# Patient Record
Sex: MALE | Race: WHITE | NOT HISPANIC OR LATINO | Employment: FULL TIME | URBAN - METROPOLITAN AREA
[De-identification: names, ages, dates, MRNs, and addresses within clinical notes are randomized per-mention and may not be internally consistent; named-entity substitution may affect disease eponyms.]

---

## 2017-02-02 DIAGNOSIS — I47.10 SVT (SUPRAVENTRICULAR TACHYCARDIA): Primary | ICD-10-CM

## 2017-02-07 ENCOUNTER — HOSPITAL ENCOUNTER (OUTPATIENT)
Dept: CARDIOLOGY | Facility: CLINIC | Age: 47
Discharge: HOME | End: 2017-02-07
Payer: COMMERCIAL

## 2017-02-07 ENCOUNTER — OFFICE VISIT (OUTPATIENT)
Dept: ELECTROPHYSIOLOGY | Facility: CLINIC | Age: 47
End: 2017-02-07
Payer: COMMERCIAL

## 2017-02-07 VITALS
WEIGHT: 185 LBS | SYSTOLIC BLOOD PRESSURE: 146 MMHG | BODY MASS INDEX: 26.48 KG/M2 | HEART RATE: 68 BPM | HEIGHT: 70 IN | DIASTOLIC BLOOD PRESSURE: 91 MMHG

## 2017-02-07 DIAGNOSIS — I47.10 SVT (SUPRAVENTRICULAR TACHYCARDIA): ICD-10-CM

## 2017-02-07 PROCEDURE — 99245 OFF/OP CONSLTJ NEW/EST HI 55: CPT | Mod: S$GLB,,, | Performed by: INTERNAL MEDICINE

## 2017-02-07 PROCEDURE — 99999 PR PBB SHADOW E&M-EST. PATIENT-LVL III: CPT | Mod: PBBFAC,,, | Performed by: INTERNAL MEDICINE

## 2017-02-07 PROCEDURE — 93000 ELECTROCARDIOGRAM COMPLETE: CPT | Mod: S$GLB,,, | Performed by: INTERNAL MEDICINE

## 2017-02-07 RX ORDER — POTASSIUM CHLORIDE 1500 MG/1
20 TABLET, EXTENDED RELEASE ORAL NIGHTLY
COMMUNITY
Start: 2017-01-05 | End: 2019-08-14

## 2017-02-07 RX ORDER — DILTIAZEM HYDROCHLORIDE 30 MG/1
30 TABLET, FILM COATED ORAL 4 TIMES DAILY
COMMUNITY
End: 2017-04-11

## 2017-02-07 RX ORDER — LEVOTHYROXINE SODIUM 50 UG/1
50 TABLET ORAL NIGHTLY
COMMUNITY
Start: 2016-12-30 | End: 2019-08-14

## 2017-02-07 RX ORDER — DILTIAZEM HYDROCHLORIDE 120 MG/1
120 CAPSULE, EXTENDED RELEASE ORAL NIGHTLY
COMMUNITY
Start: 2017-01-25 | End: 2017-04-11

## 2017-02-07 NOTE — MR AVS SNAPSHOT
"    [facility]  [address]  [facility] [address]  Phone: [phone]  Fax: [phone]                  Manny Flores   2017 1:00 PM   Office Visit    Description:  Male : 1970   Provider:  Chris Fma MD   Department:  [facility]           Reason for Visit     Irregular Heart Beat           Diagnoses this Visit        Comments    SVT (supraventricular tachycardia)                To Do List           Goals (5 Years of Data)     None      [facility]     [facility] Nurse Care Line -  Assistance  Registered nurses in the [facility] On Call Center provide clinical advisement, health education, appointment booking, and other advisory services.  Call for this free service at [phone].             Medications           Message regarding Medications     Verify the changes and/or additions to your medication regime listed below are the same as discussed with your clinician today.  If any of these changes or additions are incorrect, please notify your healthcare provider.             Verify that the below list of medications is an accurate representation of the medications you are currently taking.  If none reported, the list may be blank. If incorrect, please contact your healthcare provider. Carry this list with you in case of emergency.           Current Medications     CARTIA  mg Cp24     diltiaZEM (CARDIZEM) 30 MG tablet Take 30 mg by mouth 4 (four) times daily.    KLOR-CON M20 20 mEq tablet     SYNTHROID 50 mcg tablet            Clinical Reference Information           Your Vitals Were     BP Pulse Height Weight BMI    146/91 68 5' 10" (1.778 m) 83.9 kg (185 lb) 26.54 kg/m2      Blood Pressure          Most Recent Value    BP  (!)  146/91      Allergies as of 2017     No Known Allergies      Immunizations Administered on Date of Encounter - 2017     None      [facility] Sign-Up     Activating your [facility] account is as " easy as 1-2-3!     1) Visit [email or web address], select Sign Up Now, enter this activation code and your date of birth, then select Next.  [email or web address]  Expires: 3/24/2017  1:58 PM      2) Create a username and password to use when you visit [facility] in the future and select a security question in case you lose your password and select Next.    3) Enter your e-mail address and click Sign Up!    Additional Information  If you have questions, please e-mail [email or web address] or call [phone] to talk to our [facility] staff. Remember, [facility] is NOT to be used for urgent needs. For medical emergencies, dial 911.         Language Assistance Services     ATTENTION: Language assistance services are available, free of charge. Please call [phone].      ATENCIÓN: Si habla español, tiene a tracy disposición servicios gratuitos de asistencia lingüística. Llame al [phone].     CHÚ Ý: N?u b?n nói Ti?ng Vi?t, có các d?ch v? h? tr? ngôn ng? mi?n phí dành cho b?n. G?i s? [phone].         [facility] complies with applicable Federal civil rights laws and does not discriminate on the basis of race, color, national origin, age, disability, or sex.

## 2017-02-07 NOTE — PROGRESS NOTES
Subjective:    Patient ID:  Manny Flores is a 47 y.o. male who presents for evaluation of Irregular Heart Beat      HPI Comments: 47 yoM AF, SVT here for arrhythmia evaluation. He had post operative AF after a hernia surgery. At the time there was documented pre-excited AF. He was placed on diltiazem prn for symptomatic events. He has had several symptomatic palpitations over the next year. He underwent a treadmill test where he went into a narrow complex, short RP tachycardia 230 bpm moments into the recovery phase of his test. He felt rapid palpitations during this episode. He had several more non-sustained events. This episode matched his clinical symptoms. He was placed on long acting diltiazem and underwent an echo that showed normal EF and mild MR. He has CHADS VASC of 1 (HTN) and is on aspirin. He is here to discuss EPS/RFA    Referring Physician: Kori Hawkins    Echo 2/1/17  LVEF 57%  Mild MR    Past Medical History:    Atrial fibrillation                                           Hypertension                                                  Supraventricular tachycardia                                  History reviewed. No pertinent surgical history.    Social History    Marital status:              Spouse name:                       Years of education:                 Number of children:               Occupational History    None on file    Social History Main Topics    Smoking status: Never Smoker                                                                   Smokeless status: Not on file                       Alcohol use: No                 Comment: 1-2  beer   a year    Drug use: No              Sexual activity: Yes                  Other Topics            Concern    None on file    Social History Narrative    None on file    History reviewed.  No pertinent family history.        Review of Systems   Constitution: Negative.   HENT: Negative.    Eyes: Negative.    Cardiovascular:  Positive for irregular heartbeat and palpitations. Negative for chest pain, dyspnea on exertion, leg swelling, near-syncope and syncope.   Respiratory: Negative.  Negative for shortness of breath.    Endocrine: Negative.    Hematologic/Lymphatic: Negative.    Skin: Negative.    Musculoskeletal: Negative.    Gastrointestinal: Negative.    Genitourinary: Negative.    Neurological: Negative.  Negative for dizziness and light-headedness.   Psychiatric/Behavioral: Negative.    Allergic/Immunologic: Negative.         Objective:    Physical Exam   Constitutional: He is oriented to person, place, and time. He appears well-developed and well-nourished. No distress.   HENT:   Head: Normocephalic and atraumatic.   Eyes: EOM are normal. Pupils are equal, round, and reactive to light.   Neck: Normal range of motion. No JVD present. No thyromegaly present.   Cardiovascular: Normal rate, regular rhythm, S1 normal, S2 normal and normal heart sounds.  PMI is not displaced.  Exam reveals no gallop and no friction rub.    No murmur heard.  Pulmonary/Chest: Effort normal and breath sounds normal. No respiratory distress. He has no wheezes. He has no rales.   Abdominal: Soft. Bowel sounds are normal. He exhibits no distension. There is no tenderness. There is no rebound and no guarding.   Musculoskeletal: Normal range of motion. He exhibits no edema or tenderness.   Neurological: He is alert and oriented to person, place, and time. No cranial nerve deficit.   Skin: Skin is warm and dry. No rash noted. No erythema.   Psychiatric: He has a normal mood and affect. His behavior is normal. Judgment and thought content normal.     ECG: NSR nl AL, QRS, QTc; no pre-excitation        Assessment:       1. SVT (supraventricular tachycardia)         Plan:       47 yoM symptomatic, documented SVT here for evaluation. I suspect he had ORT given documented pre-excited AF. I recommended EPS/RFA for definitive management. Extensive discussion regarding  risks, benefits, and alternative approaches to the procedure was had with the patient.  The patient voices understanding and all questions have been answered.  The patient would like to proceed as planned.    EPS/RFA for SVT  Anesthesia/MAC  Hold diltiazem 3 days prior  Consent obtained in clinic

## 2017-02-07 NOTE — LETTER
February 7, 2017      Yanet Hawkins MD  [address]  [facility] [address]           [facility]  [address]  [facility] [address]  Phone: [phone]  Fax: [phone]          Patient: Manny Flores   MR Number: [id number]   YOB: 1970   Date of Visit: 2/7/2017       Dear Dr. Yanet Hawkins:    Thank you for referring Manny Flores to me for evaluation. Attached you will find relevant portions of my assessment and plan of care.    If you have questions, please do not hesitate to call me. I look forward to following Manny Flores along with you.    Sincerely,    Chris Fam MD    Enclosure  CC:  No Recipients    If you would like to receive this communication electronically, please contact [email or web address] or [phone] to request more information on [employer] Link access.    For providers and/or their staff who would like to refer a patient to [facility], please contact us through our one-stop-shop provider referral line, [facility], at [phone].    If you feel you have received this communication in error or would no longer like to receive these types of communications, please e-mail [email or web address]

## 2017-02-09 ENCOUNTER — TELEPHONE (OUTPATIENT)
Dept: ELECTROPHYSIOLOGY | Facility: CLINIC | Age: 47
End: 2017-02-09

## 2017-02-09 DIAGNOSIS — I47.10 SVT (SUPRAVENTRICULAR TACHYCARDIA): Primary | ICD-10-CM

## 2017-02-09 NOTE — TELEPHONE ENCOUNTER
ABLATION EDUCATION CHECKLIST    2/24/17 - Lab Work   Pre-procedure labs have been ordered and sent to you.  Please fax results to [phone].   You do not have to fast for this lab work!    3/01/17 @ 10 AM - Ablation (arrival time)  Report to Cardiology Waiting Room on 3rd floor of the Hospital    (Do not report to clinic)  Directions for Reporting to Cardiology Waiting Area in the Hospital  If you park in the Parking Garage:  Take elevators to the 2nd floor  Walk up ramp and turn right by Gold Elevators  Take elevator to the 3rd floor  Upon exiting the elevator, turn away from the clinic areas  Walk long martinez around to front of hospital to area with windows overlooking [facility]  Check in at Reception Desk  OR  If family is dropping you off:  Have them drop you off at the front of the Hospital  (Near the ER, where all the flags are hung).  Take the E elevators to the 3rd floor.  Check in at the Reception Desk in the waiting room.    Do not eat or drink anything after: 12 mn on the night before your procedure    Medications:   HOLD diltiazem (Cardizem) three (3) days prior to your procedure. YOUR LAST DOSE: Saturday, February 25, 2017.   You may take your OTHER usual morning medications with a sip of water    You will be spending the night after your procedure  You will need someone to drive you home the day after your procedure.    Your pain during your procedure will be managed by the anesthesia team.     THE ABOVE INSTRUCTIONS WERE GIVEN TO THE PATIENT VERBALLY AND THEY VERBALIZED UNDERSTANDING.  THEY DO NOT REQUIRE ANY SPECIAL NEEDS AND DO NOT HAVE ANY LEARNING BARRIERS.    Any need to reschedule or cancel procedures, or any questions regarding your procedures should be addressed directly with the Arrhythmia Department Nurses at the following phone number: [phone]

## 2017-02-16 ENCOUNTER — TELEPHONE (OUTPATIENT)
Dept: ELECTROPHYSIOLOGY | Facility: CLINIC | Age: 47
End: 2017-02-16

## 2017-02-16 NOTE — TELEPHONE ENCOUNTER
Wife calling about nuclear stress test prior to ablation. Tried calling her back but signal poor. Wife to call back later.

## 2017-02-17 ENCOUNTER — TELEPHONE (OUTPATIENT)
Dept: ELECTROPHYSIOLOGY | Facility: CLINIC | Age: 47
End: 2017-02-17

## 2017-02-17 NOTE — TELEPHONE ENCOUNTER
Called wife and advised her that Dr. Fam was ok with pt having nuclear stress test prior to RFA, but doubts it is necessary. Pt's wife reports pt canceled test yesterday and told them he doesn't wish to proceed at this time.

## 2017-02-17 NOTE — TELEPHONE ENCOUNTER
----- Message from Lu Dougherty sent at 2/16/2017  2:17 PM CST -----  Contact: Wife of pt called  Wife of pt called, states she is returning your call and will like to be reached at Ph [phone]. Thank you

## 2017-02-21 ENCOUNTER — TELEPHONE (OUTPATIENT)
Dept: ELECTROPHYSIOLOGY | Facility: CLINIC | Age: 47
End: 2017-02-21

## 2017-02-21 NOTE — TELEPHONE ENCOUNTER
----- Message from Jami Arias MA sent at 2/21/2017 12:07 PM CST -----  Contact: Nury [phone] pt's wife       ----- Message -----     From: Keerthi Chacon     Sent: 2/21/2017  11:43 AM       To: [facility] Staff    Pt wife says her  is having a procedure on 3/1/17 and he has to have labs done before. She would like to know if a blood test can be added to the labs? Please call her at the number listed today.     Thanks

## 2017-02-21 NOTE — TELEPHONE ENCOUNTER
Pt sees nutritionist, and they want pt to have a homocysteine level drawn. Wife is asking if we can add it to pt's lab orders we are having him do on Friday. Explained we mailed lab orders to them already, so we cannot add orders as they already have them in their hands. Instructed her to contact nutritionist to write separate order and take it the same day they go in with our lab orders. Understanding verbalized.

## 2017-03-01 ENCOUNTER — SURGERY (OUTPATIENT)
Age: 47
End: 2017-03-01

## 2017-03-01 ENCOUNTER — ANESTHESIA EVENT (OUTPATIENT)
Dept: MEDSURG UNIT | Facility: HOSPITAL | Age: 47
End: 2017-03-01
Payer: COMMERCIAL

## 2017-03-01 ENCOUNTER — HOSPITAL ENCOUNTER (OUTPATIENT)
Facility: HOSPITAL | Age: 47
Discharge: HOME | End: 2017-03-01
Attending: INTERNAL MEDICINE | Admitting: INTERNAL MEDICINE
Payer: COMMERCIAL

## 2017-03-01 ENCOUNTER — ANESTHESIA (OUTPATIENT)
Dept: MEDSURG UNIT | Facility: HOSPITAL | Age: 47
End: 2017-03-01
Payer: COMMERCIAL

## 2017-03-01 VITALS
DIASTOLIC BLOOD PRESSURE: 81 MMHG | RESPIRATION RATE: 18 BRPM | SYSTOLIC BLOOD PRESSURE: 125 MMHG | WEIGHT: 185 LBS | OXYGEN SATURATION: 99 % | HEIGHT: 70 IN | HEART RATE: 68 BPM | TEMPERATURE: 98 F | BODY MASS INDEX: 26.48 KG/M2

## 2017-03-01 DIAGNOSIS — I47.10 SVT (SUPRAVENTRICULAR TACHYCARDIA): Primary | ICD-10-CM

## 2017-03-01 PROCEDURE — 93010 ELECTROCARDIOGRAM REPORT: CPT | Mod: ,,, | Performed by: INTERNAL MEDICINE

## 2017-03-01 PROCEDURE — D9220A PRA ANESTHESIA: Mod: ANES,,, | Performed by: ANESTHESIOLOGY

## 2017-03-01 PROCEDURE — 25000003 PHARM REV CODE 250: Performed by: INTERNAL MEDICINE

## 2017-03-01 PROCEDURE — 25000003 PHARM REV CODE 250: Performed by: NURSE ANESTHETIST, CERTIFIED REGISTERED

## 2017-03-01 PROCEDURE — 25000003 PHARM REV CODE 250

## 2017-03-01 PROCEDURE — 93621 COMP EP EVL L PAC&REC C SINS: CPT | Mod: 26,,, | Performed by: INTERNAL MEDICINE

## 2017-03-01 PROCEDURE — 25000003 PHARM REV CODE 250: Performed by: NURSE PRACTITIONER

## 2017-03-01 PROCEDURE — 37000009 HC ANESTHESIA EA ADD 15 MINS: Performed by: INTERNAL MEDICINE

## 2017-03-01 PROCEDURE — 93010 ELECTROCARDIOGRAM REPORT: CPT | Mod: 76,,, | Performed by: INTERNAL MEDICINE

## 2017-03-01 PROCEDURE — D9220A PRA ANESTHESIA: Mod: CRNA,,, | Performed by: NURSE ANESTHETIST, CERTIFIED REGISTERED

## 2017-03-01 PROCEDURE — 93623 PRGRMD STIMJ&PACG IV RX NFS: CPT | Mod: 26,,, | Performed by: INTERNAL MEDICINE

## 2017-03-01 PROCEDURE — 37000008 HC ANESTHESIA 1ST 15 MINUTES: Performed by: INTERNAL MEDICINE

## 2017-03-01 PROCEDURE — 63600175 PHARM REV CODE 636 W HCPCS: Performed by: NURSE ANESTHETIST, CERTIFIED REGISTERED

## 2017-03-01 PROCEDURE — 63600175 PHARM REV CODE 636 W HCPCS

## 2017-03-01 PROCEDURE — 25000003 PHARM REV CODE 250: Performed by: STUDENT IN AN ORGANIZED HEALTH CARE EDUCATION/TRAINING PROGRAM

## 2017-03-01 PROCEDURE — C1730 CATH, EP, 19 OR FEW ELECT: HCPCS

## 2017-03-01 PROCEDURE — 93613 INTRACARDIAC EPHYS 3D MAPG: CPT | Mod: ,,, | Performed by: INTERNAL MEDICINE

## 2017-03-01 PROCEDURE — 93005 ELECTROCARDIOGRAM TRACING: CPT

## 2017-03-01 PROCEDURE — 93620 COMP EP EVL R AT VEN PAC&REC: CPT | Mod: 26,,, | Performed by: INTERNAL MEDICINE

## 2017-03-01 RX ORDER — FLECAINIDE ACETATE 100 MG/1
100 TABLET ORAL EVERY 12 HOURS
Qty: 60 TABLET | Refills: 3 | Status: SHIPPED | OUTPATIENT
Start: 2017-03-01 | End: 2017-04-06

## 2017-03-01 RX ORDER — ACETAMINOPHEN 325 MG/1
650 TABLET ORAL ONCE
Status: DISCONTINUED | OUTPATIENT
Start: 2017-03-01 | End: 2017-03-01

## 2017-03-01 RX ORDER — DILTIAZEM HYDROCHLORIDE 120 MG/1
120 CAPSULE, COATED, EXTENDED RELEASE ORAL NIGHTLY
Status: DISCONTINUED | OUTPATIENT
Start: 2017-03-01 | End: 2017-03-02 | Stop reason: HOSPADM

## 2017-03-01 RX ORDER — LEVOTHYROXINE SODIUM 50 UG/1
50 TABLET ORAL NIGHTLY
Status: DISCONTINUED | OUTPATIENT
Start: 2017-03-01 | End: 2017-03-02 | Stop reason: HOSPADM

## 2017-03-01 RX ORDER — PROPOFOL 10 MG/ML
VIAL (ML) INTRAVENOUS CONTINUOUS PRN
Status: DISCONTINUED | OUTPATIENT
Start: 2017-03-01 | End: 2017-03-01

## 2017-03-01 RX ORDER — SODIUM CHLORIDE 9 MG/ML
INJECTION, SOLUTION INTRAVENOUS CONTINUOUS
Status: DISCONTINUED | OUTPATIENT
Start: 2017-03-01 | End: 2017-03-01

## 2017-03-01 RX ORDER — LIDOCAINE HCL/PF 100 MG/5ML
SYRINGE (ML) INTRAVENOUS
Status: DISCONTINUED | OUTPATIENT
Start: 2017-03-01 | End: 2017-03-01

## 2017-03-01 RX ORDER — FLECAINIDE ACETATE 50 MG/1
100 TABLET ORAL EVERY 12 HOURS
Status: DISCONTINUED | OUTPATIENT
Start: 2017-03-01 | End: 2017-03-02 | Stop reason: HOSPADM

## 2017-03-01 RX ORDER — ACETAMINOPHEN 325 MG/1
650 TABLET ORAL ONCE
Status: COMPLETED | OUTPATIENT
Start: 2017-03-01 | End: 2017-03-01

## 2017-03-01 RX ADMIN — FLECAINIDE ACETATE 100 MG: 50 TABLET ORAL at 07:03

## 2017-03-01 RX ADMIN — ISOPROTERENOL HYDROCHLORIDE 2 MCG/MIN: 0.2 INJECTION, SOLUTION INTRACARDIAC; INTRAMUSCULAR; INTRAVENOUS; SUBCUTANEOUS at 02:03

## 2017-03-01 RX ADMIN — SODIUM CHLORIDE: 0.9 INJECTION, SOLUTION INTRAVENOUS at 01:03

## 2017-03-01 RX ADMIN — PROPOFOL 150 MCG/KG/MIN: 10 INJECTION, EMULSION INTRAVENOUS at 01:03

## 2017-03-01 RX ADMIN — LIDOCAINE HYDROCHLORIDE 50 MG: 20 INJECTION, SOLUTION INTRAVENOUS at 01:03

## 2017-03-01 RX ADMIN — SODIUM CHLORIDE, SODIUM GLUCONATE, SODIUM ACETATE, POTASSIUM CHLORIDE, MAGNESIUM CHLORIDE, SODIUM PHOSPHATE, DIBASIC, AND POTASSIUM PHOSPHATE: .53; .5; .37; .037; .03; .012; .00082 INJECTION, SOLUTION INTRAVENOUS at 02:03

## 2017-03-01 RX ADMIN — ACETAMINOPHEN 650 MG: 325 TABLET ORAL at 05:03

## 2017-03-01 NOTE — NURSING TRANSFER
Nursing Transfer Note      3/1/2017     Transfer To: [id number]    Transfer via stretcher    Transfer with cardiac monitoring    Transported by rn    Medicines sent: none    Chart send with patient: Yes    Notified: nurse    Patient reassessed at: see epic (date, time)

## 2017-03-01 NOTE — INTERVAL H&P NOTE
The patient has been examined and the H&P has been reviewed:    No acute changes since previous visit.    Risks and benefits discussed in clinic, consent obtained previously.  Procedure in detail discussed with patient and wife, questions answered.    Sedation per anesthesia.    Abhilash Lerma MD

## 2017-03-01 NOTE — ANESTHESIA PREPROCEDURE EVALUATION
03/01/2017  Manny Flores is a 47 y.o., male.    [facility] Anesthesia Evaluation    I have reviewed the Patient Summary Reports.    I have reviewed the Nursing Notes.   I have reviewed the Medications.     Review of Systems  Anesthesia Hx:  No problems with previous Anesthesia    Cardiovascular:   Hypertension Dysrhythmias (SVT)    Pulmonary:  Pulmonary Normal    Renal/:  Renal/ Normal     Hepatic/GI:  Hepatic/GI Normal    Neurological:  Neurology Normal    Endocrine:  Endocrine Normal        Physical Exam  General:  Well nourished    Airway/Jaw/Neck:  Airway Findings: Mouth Opening: Normal Tongue: Normal  General Airway Assessment: Adult  Mallampati: II  TM Distance: Normal, at least 6 cm       Chest/Lungs:  Chest/Lungs Findings: Clear to auscultation, Normal Respiratory Rate     Heart/Vascular:  Heart Findings: Rate: Normal  Rhythm: Regular Rhythm             Anesthesia Plan  Type of Anesthesia, risks & benefits discussed:  Anesthesia Type:  general, MAC  Patient's Preference:   Intra-op Monitoring Plan:   Intra-op Monitoring Plan Comments:   Post Op Pain Control Plan:   Post Op Pain Control Plan Comments:   Induction:   IV  Beta Blocker:  Patient is not currently on a Beta-Blocker (No further documentation required).       Informed Consent: Patient understands risks and agrees with Anesthesia plan.  Questions answered. Anesthesia consent signed with patient.  ASA Score: 2     Day of Surgery Review of History & Physical:            Ready For Surgery From Anesthesia Perspective.     Patient Active Problem List   Diagnosis    SVT (supraventricular tachycardia)

## 2017-03-01 NOTE — PROGRESS NOTES
Pt returned to room AAOx4 and denies pain.  VSS.  Bilateral groin sites c/d/i and soft.  Pedal pulses palpable bilaterally.  Family called to bedside and post procedure protocol reviewed.  Will continue to monitor.

## 2017-03-01 NOTE — H&P (VIEW-ONLY)
Subjective:    Patient ID:  Manny Flores is a 47 y.o. male who presents for evaluation of Irregular Heart Beat      HPI Comments: 47 yoM AF, SVT here for arrhythmia evaluation. He had post operative AF after a hernia surgery. At the time there was documented pre-excited AF. He was placed on diltiazem prn for symptomatic events. He has had several symptomatic palpitations over the next year. He underwent a treadmill test where he went into a narrow complex, short RP tachycardia 230 bpm moments into the recovery phase of his test. He felt rapid palpitations during this episode. He had several more non-sustained events. This episode matched his clinical symptoms. He was placed on long acting diltiazem and underwent an echo that showed normal EF and mild MR. He has CHADS VASC of 1 (HTN) and is on aspirin. He is here to discuss EPS/RFA    Referring Physician: Kori Hawkins    Echo 2/1/17  LVEF 57%  Mild MR    Past Medical History:    Atrial fibrillation                                           Hypertension                                                  Supraventricular tachycardia                                  History reviewed. No pertinent surgical history.    Social History    Marital status:              Spouse name:                       Years of education:                 Number of children:               Occupational History    None on file    Social History Main Topics    Smoking status: Never Smoker                                                                   Smokeless status: Not on file                       Alcohol use: No                 Comment: 1-2  beer   a year    Drug use: No              Sexual activity: Yes                  Other Topics            Concern    None on file    Social History Narrative    None on file    History reviewed.  No pertinent family history.        Review of Systems   Constitution: Negative.   HENT: Negative.    Eyes: Negative.    Cardiovascular:  Positive for irregular heartbeat and palpitations. Negative for chest pain, dyspnea on exertion, leg swelling, near-syncope and syncope.   Respiratory: Negative.  Negative for shortness of breath.    Endocrine: Negative.    Hematologic/Lymphatic: Negative.    Skin: Negative.    Musculoskeletal: Negative.    Gastrointestinal: Negative.    Genitourinary: Negative.    Neurological: Negative.  Negative for dizziness and light-headedness.   Psychiatric/Behavioral: Negative.    Allergic/Immunologic: Negative.         Objective:    Physical Exam   Constitutional: He is oriented to person, place, and time. He appears well-developed and well-nourished. No distress.   HENT:   Head: Normocephalic and atraumatic.   Eyes: EOM are normal. Pupils are equal, round, and reactive to light.   Neck: Normal range of motion. No JVD present. No thyromegaly present.   Cardiovascular: Normal rate, regular rhythm, S1 normal, S2 normal and normal heart sounds.  PMI is not displaced.  Exam reveals no gallop and no friction rub.    No murmur heard.  Pulmonary/Chest: Effort normal and breath sounds normal. No respiratory distress. He has no wheezes. He has no rales.   Abdominal: Soft. Bowel sounds are normal. He exhibits no distension. There is no tenderness. There is no rebound and no guarding.   Musculoskeletal: Normal range of motion. He exhibits no edema or tenderness.   Neurological: He is alert and oriented to person, place, and time. No cranial nerve deficit.   Skin: Skin is warm and dry. No rash noted. No erythema.   Psychiatric: He has a normal mood and affect. His behavior is normal. Judgment and thought content normal.     ECG: NSR nl CT, QRS, QTc; no pre-excitation        Assessment:       1. SVT (supraventricular tachycardia)         Plan:       47 yoM symptomatic, documented SVT here for evaluation. I suspect he had ORT given documented pre-excited AF. I recommended EPS/RFA for definitive management. Extensive discussion regarding  risks, benefits, and alternative approaches to the procedure was had with the patient.  The patient voices understanding and all questions have been answered.  The patient would like to proceed as planned.    EPS/RFA for SVT  Anesthesia/MAC  Hold diltiazem 3 days prior  Consent obtained in clinic

## 2017-03-01 NOTE — PROGRESS NOTES
Pt is AAOx4 and denies pain.  VSS.  Admit questions completed.  Wife at bedside.  See full assessment for details.  Will continue to monitor.

## 2017-03-01 NOTE — PLAN OF CARE
Problem: Patient Care Overview  Goal: Plan of Care Review  Outcome: Ongoing (interventions implemented as appropriate)  See epic    Comments:   See epic

## 2017-03-01 NOTE — TRANSFER OF CARE
"Anesthesia Transfer of Care Note    Patient: Manny Flores    Procedure(s) Performed: Procedure(s) (LRB):  ABLATION (N/A)    Patient location: PACU    Anesthesia Type: general    Transport from OR: Transported from OR on 6-10 L/min O2 by face mask with adequate spontaneous ventilation    Post pain: adequate analgesia    Post assessment: no apparent anesthetic complications and tolerated procedure well    Post vital signs: stable    Level of consciousness: sedated    Nausea/Vomiting: no nausea/vomiting    Complications: none          Last vitals:   Visit Vitals    BP (!) 141/77 (BP Location: Left arm, Patient Position: Lying, BP Method: Automatic)    Pulse 81    Temp 36.7 °C (98.1 °F) (Oral)    Resp 16    Ht 5' 10" (1.778 m)    Wt 83.9 kg (185 lb)    SpO2 98%    BMI 26.54 kg/m2     "

## 2017-03-01 NOTE — PROGRESS NOTES
Patient admitted to recovery see [facility] for complete assessment pacu bcg's maintained safety measures verified patient instructed on pain scale and patient verbalized understanding. Called patient's wife and updated on patient location with the permission of patient.

## 2017-03-01 NOTE — IP AVS SNAPSHOT
[facility]  [address]  [facility] [address]  Phone: [phone]           Patient Discharge Instructions     Our goal is to set you up for success. This packet includes information on your condition, medications, and your home care. It will help you to care for yourself so you don't get sicker and need to go back to the hospital.     Please ask your nurse if you have any questions.        There are many details to remember when preparing to leave the hospital. Here is what you will need to do:    1. Take your medicine. If you are prescribed medications, review your Medication List in the following pages. You may have new medications to  at the pharmacy and others that you'll need to stop taking. Review the instructions for how and when to take your medications. Talk with your doctor or nurses if you are unsure of what to do.     2. Go to your follow-up appointments. Specific follow-up information is listed in the following pages. Your may be contacted by a transition nurse or clinical provider about future appointments. Be sure we have all of the phone numbers to reach you, if needed. Please contact your provider's office if you are unable to make an appointment.     3. Watch for warning signs. Your doctor or nurse will give you detailed warning signs to watch for and when to call for assistance. These instructions may also include educational information about your condition. If you experience any of warning signs to your health, call your doctor.               [facility]  Unless otherwise directed by your provider, please contact [facility], our nurse care line that is available for 24/7 assistance.     [phone] (toll-free)    Registered nurses in the [facility] provide clinical advisement, health education, appointment booking, and other advisory services.                    ** Verify the list of medication(s) below is accurate and up  to date. Carry this with you in case of emergency. If your medications have changed, please notify your healthcare provider.             Medication List      START taking these medications        Additional Info                      flecainide 100 MG Tab   Commonly known as:  TAMBOCOR   Quantity:  60 tablet   Refills:  3   Dose:  100 mg    Last time this was given:  100 mg on 3/1/2017  7:59 PM   Instructions:  Take 1 tablet (100 mg total) by mouth every 12 (twelve) hours.     Begin Date    AM    Noon    PM    Bedtime         CONTINUE taking these medications        Additional Info                      * diltiaZEM 30 MG tablet   Commonly known as:  CARDIZEM   Refills:  0   Dose:  30 mg    Instructions:  Take 30 mg by mouth 4 (four) times daily.     Begin Date    AM    Noon    PM    Bedtime       * CARTIA  MG Cp24   Refills:  0   Generic drug:  diltiaZEM    Instructions:  every evening.     Begin Date    AM    Noon    PM    Bedtime       KLOR-CON M20 20 MEQ tablet   Refills:  0   Generic drug:  potassium chloride SA    Instructions:  every evening.     Begin Date    AM    Noon    PM    Bedtime       SYNTHROID 50 MCG tablet   Refills:  0   Generic drug:  levothyroxine    Instructions:  every evening.     Begin Date    AM    Noon    PM    Bedtime       * Notice:  This list has 2 medication(s) that are the same as other medications prescribed for you. Read the directions carefully, and ask your doctor or other care provider to review them with you.         Where to Get Your Medications      These medications were sent to [facility] [address] [facility], [place] - [address]  [address] [place] [address]     Phone:  [phone]     flecainide 100 MG Tab                  Please bring to all follow up appointments:    1. A copy of your discharge instructions.  2. All medicines you are currently taking in their original bottles.  3. Identification and insurance card.    Please arrive 15  minutes ahead of scheduled appointment time.    Please call 24 hours in advance if you must reschedule your appointment and/or time.        Follow-up Information     Follow up with Chris Fam MD In 6 weeks.    Specialties:  Electrophysiology, Cardiovascular Disease    Contact information:    [address]  [place] [address]  [phone]          Discharge Instructions     Future Orders    Holter Monitor - 3-14 Day Adult (zio patch)       Discharge References/Attachments     CATHETER ABLATION, AFTER (ENGLISH)    FLECAINIDE TABLETS (ENGLISH)        Primary Diagnosis     Your primary diagnosis was:  Irregular Heartbeat      Admission Information     Date & Time Provider Department CSN    3/1/2017  8:33 AM Chris Fam MD [facility] [id number]      Care Providers     Provider Role Specialty Primary office phone    Chris Fam MD Attending Provider Electrophysiology [phone]    Chris Fam MD Surgeon  Electrophysiology [phone]      Your Vitals Were     BP                   125/81           Recent Lab Values     No lab values to display.      Allergies as of 3/1/2017     No Known Allergies      Advance Directives     An advance directive is a document which, in the event you are no longer able to make decisions for yourself, tells your healthcare team what kind of treatment you do or do not want to receive, or who you would like to make those decisions for you.  If you do not currently have an advance directive, [facility] encourages you to create one.  For more information call:  [phone]), [phone] ([phone]),  or log on to [email or web address].        Language Assistance Services     ATTENTION: Language assistance services are available, free of charge. Please call [phone].      ATENCIÓN: Si habla español, tiene a tracy disposición servicios gratuitos de asistencia lingüística. Llame al [phone].     ALBERT Ý: N?u b?n  nói Ti?ng Vi?t, có các d?ch v? h? tr? ngôn ng? mi?n phí dành cho b?n. G?i s? [phone].        [facility] Sign-Up     Activating your [facility] account is as easy as 1-2-3!     1) Visit [email or web address], select Sign Up Now, enter this activation code and your date of birth, then select Next.  [email or web address]  Expires: 3/24/2017  1:58 PM      2) Create a username and password to use when you visit [facility] in the future and select a security question in case you lose your password and select Next.    3) Enter your e-mail address and click Sign Up!    Additional Information  If you have questions, please e-mail [email or web address] or call [phone] to talk to our [facility] staff. Remember, [facility] is NOT to be used for urgent needs. For medical emergencies, dial 911.          [facility] complies with applicable Federal civil rights laws and does not discriminate on the basis of race, color, national origin, age, disability, or sex.

## 2017-03-01 NOTE — ANESTHESIA RELEASE NOTE
Anesthesia Release from PACU Note    Patient: Manny Flores  Anesthesia Release from PACU Note    Patient: Manny Flores    Procedure(s) Performed: Procedure(s) (LRB):  ABLATION (N/A)    Anesthesia type: general    Post pain: Adequate analgesia    Post assessment: no apparent anesthetic complications, tolerated procedure well and no evidence of recall    Post vital signs:   Vitals:    03/01/17 1545   BP: 138/78   Pulse: 74   Resp: 18   Temp:         Level of consciousness: awake, alert  and oriented    Nausea/Vomiting: no nausea/no vomiting    Complications: none    Airway Patency: patent    Respiratory: unassisted, spontaneous ventilation    Cardiovascular: stable and blood pressure at baseline    Hydration: euvolemic

## 2017-03-01 NOTE — ANESTHESIA POSTPROCEDURE EVALUATION
"Anesthesia Post Evaluation    Patient: Manny Flores    Procedure(s) Performed: Procedure(s) (LRB):  ABLATION (N/A)    Final Anesthesia Type: general  Patient location during evaluation: PACU  Patient participation: Yes- Able to Participate  Level of consciousness: awake and alert  Post-procedure vital signs: reviewed and stable  Pain management: adequate  Airway patency: patent  PONV status at discharge: No PONV  Anesthetic complications: no      Cardiovascular status: blood pressure returned to baseline  Respiratory status: unassisted  Hydration status: euvolemic  Follow-up not needed.        Visit Vitals    /78 (BP Location: Left arm, Patient Position: Lying, BP Method: Automatic)    Pulse 74    Temp 36.8 °C (98.2 °F) (Axillary)    Resp 18    Ht 5' 10" (1.778 m)    Wt 83.9 kg (185 lb)    SpO2 98%    BMI 26.54 kg/m2       Pain/Silas Score: Pain Assessment Performed: Yes (3/1/2017  3:30 PM)  Presence of Pain: denies (3/1/2017  3:30 PM)  Silas Score: 10 (3/1/2017  3:00 PM)      "

## 2017-03-02 ENCOUNTER — LAB VISIT (OUTPATIENT)
Dept: LAB | Facility: HOSPITAL | Age: 47
End: 2017-03-02
Attending: INTERNAL MEDICINE
Payer: COMMERCIAL

## 2017-03-02 ENCOUNTER — OFFICE VISIT (OUTPATIENT)
Dept: CARDIOLOGY | Facility: CLINIC | Age: 47
End: 2017-03-02
Payer: COMMERCIAL

## 2017-03-02 ENCOUNTER — CLINICAL SUPPORT (OUTPATIENT)
Dept: ELECTROPHYSIOLOGY | Facility: CLINIC | Age: 47
End: 2017-03-02
Payer: COMMERCIAL

## 2017-03-02 VITALS
SYSTOLIC BLOOD PRESSURE: 124 MMHG | BODY MASS INDEX: 27.11 KG/M2 | DIASTOLIC BLOOD PRESSURE: 72 MMHG | WEIGHT: 189.38 LBS | HEART RATE: 68 BPM | HEIGHT: 70 IN

## 2017-03-02 DIAGNOSIS — I47.10 SVT (SUPRAVENTRICULAR TACHYCARDIA): ICD-10-CM

## 2017-03-02 DIAGNOSIS — I48.0 PAROXYSMAL ATRIAL FIBRILLATION: ICD-10-CM

## 2017-03-02 DIAGNOSIS — I48.0 PAROXYSMAL A-FIB: ICD-10-CM

## 2017-03-02 DIAGNOSIS — I48.0 PAROXYSMAL A-FIB: Primary | ICD-10-CM

## 2017-03-02 LAB
ANION GAP SERPL CALC-SCNC: 8 MMOL/L
APTT BLDCRRT: 28.2 SEC
BASOPHILS # BLD AUTO: 0.02 K/UL
BASOPHILS NFR BLD: 0.3 %
BUN SERPL-MCNC: 19 MG/DL
CALCIUM SERPL-MCNC: 9.4 MG/DL
CHLORIDE SERPL-SCNC: 104 MMOL/L
CO2 SERPL-SCNC: 27 MMOL/L
CREAT SERPL-MCNC: 1.1 MG/DL
DIFFERENTIAL METHOD: ABNORMAL
EOSINOPHIL # BLD AUTO: 0.2 K/UL
EOSINOPHIL NFR BLD: 2.7 %
ERYTHROCYTE [DISTWIDTH] IN BLOOD BY AUTOMATED COUNT: 13.7 %
EST. GFR  (AFRICAN AMERICAN): >60 ML/MIN/1.73 M^2
EST. GFR  (NON AFRICAN AMERICAN): >60 ML/MIN/1.73 M^2
GLUCOSE SERPL-MCNC: 92 MG/DL
HCT VFR BLD AUTO: 43.7 %
HGB BLD-MCNC: 15.3 G/DL
INR PPP: 1
LYMPHOCYTES # BLD AUTO: 2.1 K/UL
LYMPHOCYTES NFR BLD: 35.1 %
MCH RBC QN AUTO: 31.9 PG
MCHC RBC AUTO-ENTMCNC: 35 %
MCV RBC AUTO: 91 FL
MONOCYTES # BLD AUTO: 0.4 K/UL
MONOCYTES NFR BLD: 7.2 %
NEUTROPHILS # BLD AUTO: 3.3 K/UL
NEUTROPHILS NFR BLD: 54.7 %
PLATELET # BLD AUTO: 229 K/UL
PMV BLD AUTO: 8.6 FL
POTASSIUM SERPL-SCNC: 4.3 MMOL/L
PROTHROMBIN TIME: 10.4 SEC
RBC # BLD AUTO: 4.79 M/UL
SODIUM SERPL-SCNC: 139 MMOL/L
WBC # BLD AUTO: 5.96 K/UL

## 2017-03-02 PROCEDURE — 1160F RVW MEDS BY RX/DR IN RCRD: CPT | Mod: S$GLB,,, | Performed by: INTERNAL MEDICINE

## 2017-03-02 PROCEDURE — 0296T HOLTER MONITOR - 3-14 DAY ADULT: CPT | Mod: 51,,, | Performed by: INTERNAL MEDICINE

## 2017-03-02 PROCEDURE — 99999 PR PBB SHADOW E&M-EST. PATIENT-LVL III: CPT | Mod: PBBFAC,,, | Performed by: INTERNAL MEDICINE

## 2017-03-02 PROCEDURE — 85025 COMPLETE CBC W/AUTO DIFF WBC: CPT

## 2017-03-02 PROCEDURE — 80048 BASIC METABOLIC PNL TOTAL CA: CPT

## 2017-03-02 PROCEDURE — 85610 PROTHROMBIN TIME: CPT

## 2017-03-02 PROCEDURE — 0298T HOLTER MONITOR - 3-14 DAY ADULT: CPT | Mod: ,,, | Performed by: INTERNAL MEDICINE

## 2017-03-02 PROCEDURE — 99212 OFFICE O/P EST SF 10 MIN: CPT | Mod: S$GLB,,, | Performed by: INTERNAL MEDICINE

## 2017-03-02 PROCEDURE — 36415 COLL VENOUS BLD VENIPUNCTURE: CPT

## 2017-03-02 PROCEDURE — 85730 THROMBOPLASTIN TIME PARTIAL: CPT

## 2017-03-02 NOTE — NURSING
Dr Lerma notified of patient's protocol complete with no signs or symptoms of complications from procedure.  Patient and wife are questioning if he is being discharged.

## 2017-03-02 NOTE — DISCHARGE SUMMARY
[facility]  Discharge Note  Short Stay    Admit Date: 3/1/2017    Discharge Date and Time: 03/01/2017     Attending Physician: Chris Fam MD     Discharge Provider: Abhilash Lerma    Diagnoses:  Active Hospital Problems    Diagnosis  POA    *SVT (supraventricular tachycardia) [I47.1]  Yes      Resolved Hospital Problems    Diagnosis Date Resolved POA   No resolved problems to display.       Discharged Condition: good    Hospital Course: Patient was admitted for an outpatient procedure and tolerated the procedure well with no complications.    Final Diagnoses: AF    Disposition: Home or Self Care    Follow up/Patient Instructions:    Medications:  Reconciled Home Medications:   Current Discharge Medication List      START taking these medications    Details   flecainide (TAMBOCOR) 100 MG Tab Take 1 tablet (100 mg total) by mouth every 12 (twelve) hours.  Qty: 60 tablet, Refills: 3         CONTINUE these medications which have NOT CHANGED    Details   KLOR-CON M20 20 mEq tablet every evening.       SYNTHROID 50 mcg tablet every evening.       CARTIA  mg Cp24 every evening.       diltiaZEM (CARDIZEM) 30 MG tablet Take 30 mg by mouth 4 (four) times daily.             Discharge Procedure Orders  Holter Monitor - 3-14 Day Adult (zio patch)   Standing Status: Future  Standing Exp. Date: 03/01/18       Follow-up Information     Follow up with Chris Fam MD In 6 weeks.    Specialties:  Electrophysiology, Cardiovascular Disease    Contact information:    [address] [place] [address]  [phone]            Discharge Procedure Orders (must include Diet, Follow-up, Activity):    Discharge Procedure Orders (must include Diet, Follow-up, Activity)  Holter Monitor - 3-14 Day Adult (zio patch)   Standing Status: Future  Standing Exp. Date: 03/01/18

## 2017-03-02 NOTE — NURSING
Discharge instructions and medlist given.  Medication education given.  Instructed to go to clinic in am after 8am for monitor.  Patient and wife verbalized understanding.  Denies need for wheelchair and escort for discharge.  Off unit [facility] with [facility].

## 2017-03-02 NOTE — MR AVS SNAPSHOT
[facility]  [address]  [address]  [place] [address]  Phone: [phone]  Fax: [phone]                  Manny Flores   3/2/2017 1:00 PM   Office Visit    Description:  Male : 1970   Provider:  Chris Fam MD   Department:  [facility]           Diagnoses this Visit        Comments    Paroxysmal atrial fibrillation                To Do List           Goals (5 Years of Data)     None      Follow-Up and Disposition     Return in about 4 weeks (around 3/30/2017).      [facility]     [facility] On Call Nurse Care Line -  Assistance  Registered nurses in the [facility] Call Center provide clinical advisement, health education, appointment booking, and other advisory services.  Call for this free service at [phone].             Medications           Message regarding Medications     Verify the changes and/or additions to your medication regime listed below are the same as discussed with your clinician today.  If any of these changes or additions are incorrect, please notify your healthcare provider.             Verify that the below list of medications is an accurate representation of the medications you are currently taking.  If none reported, the list may be blank. If incorrect, please contact your healthcare provider. Carry this list with you in case of emergency.           Current Medications     CARTIA  mg Cp24 every evening.     diltiaZEM (CARDIZEM) 30 MG tablet Take 30 mg by mouth 4 (four) times daily.    flecainide (TAMBOCOR) 100 MG Tab Take 1 tablet (100 mg total) by mouth every 12 (twelve) hours.    KLOR-CON M20 20 mEq tablet every evening.     SYNTHROID 50 mcg tablet every evening.            Clinical Reference Information           Your Vitals Were     BP                   124/72 (BP Location: Left arm, Patient Position: Sitting, BP Method: Manual)           Blood Pressure          Most Recent Value    BP  124/72       Allergies as of 3/2/2017     No Known Allergies      Immunizations Administered on Date of Encounter - 3/2/2017     None      [facility] Sign-Up     Activating your [facility] account is as easy as 1-2-3!     1) Visit [email or web address], select Sign Up Now, enter this activation code and your date of birth, then select Next.  [email or web address]  Expires: 3/24/2017  1:58 PM      2) Create a username and password to use when you visit [facility] in the future and select a security question in case you lose your password and select Next.    3) Enter your e-mail address and click Sign Up!    Additional Information  If you have questions, please e-mail [email or web address] or call [phone] to talk to our [facility] staff. Remember, [facility] is NOT to be used for urgent needs. For medical emergencies, dial 911.         Language Assistance Services     ATTENTION: Language assistance services are available, free of charge. Please call [phone].      ATENCIÓN: Si habla bhavesh, tiene a tracy disposición servicios gratuitos de asistencia lingüística. Llame al [phone].     CHÚ Ý: N?u b?n nói Ti?ng Vi?t, có các d?ch v? h? tr? ngôn ng? mi?n phí dành cho b?n. G?i s? [phone].         [facility] complies with applicable Federal civil rights laws and does not discriminate on the basis of race, color, national origin, age, disability, or sex.

## 2017-03-02 NOTE — PROGRESS NOTES
Subjective:    Patient ID:  Manny Flores is a 47 y.o. male who presents for follow-up of No chief complaint on file.      HPI Comments: 47 yoM AF, SVT here for arrhythmia evaluation. He had post operative AF after a hernia surgery. At the time there was documented pre-excited AF. He was placed on diltiazem prn for symptomatic events. He has had several symptomatic palpitations over the next year. He underwent a treadmill test where he went into a narrow complex, short RP tachycardia 230 bpm moments into the recovery phase of his test. He felt rapid palpitations during this episode. He had several more non-sustained events. This episode matched his clinical symptoms. He was placed on long acting diltiazem and underwent an echo that showed normal EF and mild MR. He has CHADS VASC of 1 (HTN) and is on aspirin. He is here to discuss EPS/RFA    Interval history: EPS yesterday showed no AP, no dual AVN physiology. AF easily induced with A pacing.     Referring Physician: Kori Hawkins    Echo 2/1/17  LVEF 57%  Mild MR    Past Medical History:  No date: Atrial fibrillation  No date: Hypertension  No date: Supraventricular tachycardia  No date: Thyroid disease    Past Surgical History:  No date: HERNIA REPAIR      Comment: mesh placed  No date: wisdom teeth extracted    Social History    Marital status:              Spouse name:                       Years of education:                 Number of children:               Occupational History    None on file    Social History Main Topics    Smoking status: Never Smoker                                                                   Smokeless status: Not on file                       Alcohol use: Yes                Comment: social events    Drug use: No              Sexual activity: Yes                  Other Topics            Concern    None on file    Social History Narrative    None on file    History reviewed.  No pertinent family  history.        Review of Systems   Constitution: Negative.   HENT: Negative.    Eyes: Negative.    Cardiovascular: Negative for chest pain, dyspnea on exertion, irregular heartbeat, leg swelling, near-syncope, palpitations and syncope.   Respiratory: Negative.  Negative for shortness of breath.    Endocrine: Negative.    Hematologic/Lymphatic: Negative.    Skin: Negative.    Musculoskeletal: Negative.    Gastrointestinal: Negative.    Genitourinary: Negative.    Neurological: Negative.  Negative for dizziness and light-headedness.   Psychiatric/Behavioral: Negative.    Allergic/Immunologic: Negative.         Objective:    Physical Exam   Constitutional: He is oriented to person, place, and time. He appears well-developed and well-nourished. No distress.   HENT:   Head: Normocephalic and atraumatic.   Eyes: EOM are normal. Pupils are equal, round, and reactive to light.   Neck: Normal range of motion. No JVD present. No thyromegaly present.   Cardiovascular: Normal rate, regular rhythm, S1 normal, S2 normal and normal heart sounds.  PMI is not displaced.  Exam reveals no gallop and no friction rub.    No murmur heard.  Pulmonary/Chest: Effort normal and breath sounds normal. No respiratory distress. He has no wheezes. He has no rales.   Abdominal: Soft. Bowel sounds are normal. He exhibits no distension. There is no tenderness. There is no rebound and no guarding.   Musculoskeletal: Normal range of motion. He exhibits no edema or tenderness.   Neurological: He is alert and oriented to person, place, and time. No cranial nerve deficit.   Skin: Skin is warm and dry. No rash noted. No erythema.   Psychiatric: He has a normal mood and affect. His behavior is normal. Judgment and thought content normal.         Assessment:       1. Paroxysmal atrial fibrillation         Plan:       47 yoM s/p EPS here to discuss results. Findings reviewed with patient and his spouse. Will focus on AF management. Will have him return in 4  weeks after his Zio patch is complete to discuss PVI. Will hold AAD therapy in the interim. Continue aspirin 81 mg.

## 2017-04-06 ENCOUNTER — TELEPHONE (OUTPATIENT)
Dept: ELECTROPHYSIOLOGY | Facility: CLINIC | Age: 47
End: 2017-04-06

## 2017-04-06 ENCOUNTER — OFFICE VISIT (OUTPATIENT)
Dept: CARDIOLOGY | Facility: CLINIC | Age: 47
End: 2017-04-06
Payer: COMMERCIAL

## 2017-04-06 VITALS
DIASTOLIC BLOOD PRESSURE: 80 MMHG | WEIGHT: 184.75 LBS | HEIGHT: 70 IN | SYSTOLIC BLOOD PRESSURE: 116 MMHG | HEART RATE: 84 BPM | BODY MASS INDEX: 26.45 KG/M2

## 2017-04-06 DIAGNOSIS — I48.0 PAROXYSMAL ATRIAL FIBRILLATION: Primary | ICD-10-CM

## 2017-04-06 PROCEDURE — 1160F RVW MEDS BY RX/DR IN RCRD: CPT | Mod: S$GLB,,, | Performed by: INTERNAL MEDICINE

## 2017-04-06 PROCEDURE — 99215 OFFICE O/P EST HI 40 MIN: CPT | Mod: S$GLB,,, | Performed by: INTERNAL MEDICINE

## 2017-04-06 PROCEDURE — 99999 PR PBB SHADOW E&M-EST. PATIENT-LVL III: CPT | Mod: PBBFAC,,, | Performed by: INTERNAL MEDICINE

## 2017-04-06 NOTE — Clinical Note
Please see note for PVI cryo case. He lives in [place] and is coming to [place] April 20-21. Can we get his imaging set up for one of those dates?  Thanks, MB

## 2017-04-06 NOTE — MR AVS SNAPSHOT
[facility]  [address]  [place] [address]  Phone: [phone]  Fax: [phone]                  Manny Flores   2017 11:40 AM   Office Visit    Description:  Male : 1970   Provider:  Chris Fam MD   Department:  [facility]           Reason for Visit     Atrial Fibrillation           Diagnoses this Visit        Comments    Paroxysmal atrial fibrillation    -  Primary            To Do List           Goals (5 Years of Data)     None      [facility]     [facility] Nurse Care [facility] -  Assistance  Unless otherwise directed by your provider, please contact [facility], our nurse care line that is available for  assistance.     Registered nurses in the [facility] provide: appointment scheduling, clinical advisement, health education, and other advisory services.  Call: [phone] (toll free)               Medications           Message regarding Medications     Verify the changes and/or additions to your medication regime listed below are the same as discussed with your clinician today.  If any of these changes or additions are incorrect, please notify your healthcare provider.        STOP taking these medications     flecainide (TAMBOCOR) 100 MG Tab Take 1 tablet (100 mg total) by mouth every 12 (twelve) hours.           Verify that the below list of medications is an accurate representation of the medications you are currently taking.  If none reported, the list may be blank. If incorrect, please contact your healthcare provider. Carry this list with you in case of emergency.           Current Medications     CARTIA  mg Cp24 Take 120 mg by mouth every evening.     diltiaZEM (CARDIZEM) 30 MG tablet Take 30 mg by mouth 4 (four) times daily.    KLOR-CON M20 20 mEq tablet Take 20 mEq by mouth every evening.     SYNTHROID 50 mcg tablet Take 50 mcg by mouth every evening.            Clinical  "Reference Information           Your Vitals Were     BP Pulse Height Weight BMI    116/80 84 5' 10" (1.778 m) 83.8 kg (184 lb 11.9 oz) 26.51 kg/m2      Blood Pressure          Most Recent Value    Right Arm BP - Sitting  116/80    Left Arm BP - Sitting  120/84    BP  116/80      Allergies as of 4/6/2017     No Known Allergies      Immunizations Administered on Date of Encounter - 4/6/2017     None      Smoking Cessation     If you would like to quit smoking:   You may be eligible for free services if you are a [place] resident and started smoking cigarettes before September 1, 1988.  Call the Smoking Cessation Trust ([facility]) toll free at ([phone] or [phone]) [phone].   Call 2-998-QUIT-NOW if you do not meet the above criteria.   Contact us via email: [email or web address]   View our website for more information: [email or web address]        Language Assistance Services     ATTENTION: Language assistance services are available, free of charge. Please call [phone].      ATENCIÓN: Si habla español, tiene a tracy disposición servicios gratuitos de asistencia lingüística. Llame al [phone].     CHÚ Ý: N?u b?n nói Ti?ng Vi?t, có các d?ch v? h? tr? ngôn ng? mi?n phí dành cho b?n. G?i s? [phone].         [facility] complies with applicable Federal civil rights laws and does not discriminate on the basis of race, color, national origin, age, disability, or sex.        "

## 2017-04-06 NOTE — PROGRESS NOTES
Subjective:    Patient ID:  Manny Flores is a 47 y.o. male who presents for follow-up of Atrial Fibrillation      HPI Comments: 47 yoM AF, SVT here for arrhythmia evaluation. He had post operative AF after a hernia surgery. At the time there was documented pre-excited AF. He was placed on diltiazem prn for symptomatic events. He has had several symptomatic palpitations over the next year. He underwent a treadmill test where he went into a narrow complex, short RP tachycardia 230 bpm moments into the recovery phase of his test. He felt rapid palpitations during this episode. He had several more non-sustained events. This episode matched his clinical symptoms. He was placed on long acting diltiazem and underwent an echo that showed normal EF and mild MR. He has CHADS VASC of 1 (HTN) and is on aspirin. He is here to discuss EPS/RFA    3/2/17: EPS yesterday showed no AP, no dual AVN physiology. AF easily induced with A pacing. Diltiazem started. Flecainide PRN offered but not taken.    Interval history: He weaned himself off diltiazem. He had another AF/RVR episode that was symptomatic. He took diltiazem which broke the arrhythmia. He has stopped caffeine. He has had lifestyle changes. TSH was normal prior to the EPS. He wishes to proceed with PVI as opposed to AAD therapy.     Referring Physician: Kori Hawkins    Echo 2/1/17  LVEF 57%  Mild MR    Past Medical History:  No date: Atrial fibrillation  No date: Hypertension  No date: Supraventricular tachycardia  No date: Thyroid disease    Past Surgical History:  No date: HERNIA REPAIR      Comment: mesh placed  No date: wisdom teeth extracted    Social History    Marital status:              Spouse name:                       Years of education:                 Number of children:               Occupational History    None on file    Social History Main Topics    Smoking status: Never Smoker                                                                    Smokeless status: Not on file                       Alcohol use: Yes                Comment: social events    Drug use: No              Sexual activity: Yes                  Other Topics            Concern    None on file    Social History Narrative    None on file    History reviewed.  No pertinent family history.      Atrial Fibrillation   Symptoms are negative for chest pain, dizziness, palpitations, shortness of breath and syncope. Past medical history includes atrial fibrillation.       Review of Systems   Constitution: Negative.   HENT: Negative.    Eyes: Negative.    Cardiovascular: Negative for chest pain, dyspnea on exertion, irregular heartbeat, leg swelling, near-syncope, palpitations and syncope.   Respiratory: Negative.  Negative for shortness of breath.    Endocrine: Negative.    Hematologic/Lymphatic: Negative.    Skin: Negative.    Musculoskeletal: Negative.    Gastrointestinal: Negative.    Genitourinary: Negative.    Neurological: Negative.  Negative for dizziness and light-headedness.   Psychiatric/Behavioral: Negative.    Allergic/Immunologic: Negative.         Objective:    Physical Exam   Constitutional: He is oriented to person, place, and time. He appears well-developed and well-nourished. No distress.   HENT:   Head: Normocephalic and atraumatic.   Eyes: EOM are normal. Pupils are equal, round, and reactive to light.   Neck: Normal range of motion. No JVD present. No thyromegaly present.   Cardiovascular: Normal rate, regular rhythm, S1 normal, S2 normal and normal heart sounds.  PMI is not displaced.  Exam reveals no gallop and no friction rub.    No murmur heard.  Pulmonary/Chest: Effort normal and breath sounds normal. No respiratory distress. He has no wheezes. He has no rales.   Abdominal: Soft. Bowel sounds are normal. He exhibits no distension. There is no tenderness. There is no rebound and no guarding.   Musculoskeletal: Normal range of motion. He exhibits no edema or  tenderness.   Neurological: He is alert and oriented to person, place, and time. No cranial nerve deficit.   Skin: Skin is warm and dry. No rash noted. No erythema.   Psychiatric: He has a normal mood and affect. His behavior is normal. Judgment and thought content normal.         Assessment:       1. Paroxysmal atrial fibrillation         Plan:       47 yoM symptomatic pAF here for follow up. He wishes to proceed with PVI. I had extensive discussion with patient regarding risks and benefits of PVI/WACA, and he would like to proceed. Risks of procedure include (but are not limited to) bleeding, stroke, perforation requiring emergency draining or surgery, AV block, death, AV fistula, AE fistula, PV stenosis. He has a CHADSVASC score of 0. He will start OAC for 2 months after the PVI. He wishes to have the procedure in June.    Cryoballon PVI  CT/MRI prior  Anesthesia  LOY prior, cancel if in NSR  CIRO

## 2017-04-07 ENCOUNTER — TELEPHONE (OUTPATIENT)
Dept: CARDIOLOGY | Facility: CLINIC | Age: 47
End: 2017-04-07

## 2017-04-07 ENCOUNTER — PATIENT MESSAGE (OUTPATIENT)
Dept: CARDIOLOGY | Facility: CLINIC | Age: 47
End: 2017-04-07

## 2017-04-07 RX ORDER — FLECAINIDE ACETATE 100 MG/1
100 TABLET ORAL EVERY 12 HOURS PRN
Qty: 60 TABLET | Refills: 11 | Status: SHIPPED | OUTPATIENT
Start: 2017-04-07 | End: 2017-06-12

## 2017-04-07 RX ORDER — FLECAINIDE ACETATE 100 MG/1
100 TABLET ORAL EVERY 12 HOURS
Qty: 60 TABLET | Refills: 11 | Status: SHIPPED | OUTPATIENT
Start: 2017-04-07 | End: 2017-04-07 | Stop reason: SDUPTHER

## 2017-04-07 NOTE — TELEPHONE ENCOUNTER
Patient called--states having irregular heart beats with pauses and a heart rate of 65--denies any symptoms--please advise

## 2017-04-07 NOTE — TELEPHONE ENCOUNTER
----- Message from Kristina Davies MA sent at 4/7/2017  3:22 PM CDT -----  Contact: self  Pt. was seen yesterday Thurs.4/6 in [place] by Dr Fam. Calling about irregular heart beat that started last night,going on right now as we speak. Please call  [phone].States that he was going to get med that gave him but hasn't gotten it yet.

## 2017-04-07 NOTE — TELEPHONE ENCOUNTER
Left message for pt to get Flecainide and start taking it as prescribed. Go to ER if worsens. Call on call EP if needed over weekend.

## 2017-04-08 ENCOUNTER — PATIENT MESSAGE (OUTPATIENT)
Dept: CARDIOLOGY | Facility: CLINIC | Age: 47
End: 2017-04-08

## 2017-04-10 ENCOUNTER — TELEPHONE (OUTPATIENT)
Dept: ELECTROPHYSIOLOGY | Facility: CLINIC | Age: 47
End: 2017-04-10

## 2017-04-10 NOTE — TELEPHONE ENCOUNTER
----- Message from Connie Edgar MA sent at 4/10/2017  9:41 AM CDT -----  Contact: patient called  Geneva please call the patient at  [phone] he need to talk  to you about his hospital stay in  [place]. Thank you.

## 2017-04-10 NOTE — TELEPHONE ENCOUNTER
Pt calling to report he was in ER this weekend with AF. He said he spoke with Dr. Fam and they discussed earlier ablation. Advised Pt I will look for earlier date and let him know if I can get himi in.

## 2017-04-11 ENCOUNTER — PATIENT MESSAGE (OUTPATIENT)
Dept: CARDIOLOGY | Facility: CLINIC | Age: 47
End: 2017-04-11

## 2017-04-11 RX ORDER — DILTIAZEM HYDROCHLORIDE 120 MG/1
120 CAPSULE, COATED, EXTENDED RELEASE ORAL DAILY
COMMUNITY
Start: 2017-04-09 | End: 2017-08-18 | Stop reason: SDUPTHER

## 2017-04-12 ENCOUNTER — TELEPHONE (OUTPATIENT)
Dept: ELECTROPHYSIOLOGY | Facility: CLINIC | Age: 47
End: 2017-04-12

## 2017-04-12 NOTE — TELEPHONE ENCOUNTER
----- Message from Chris Fam MD sent at 4/12/2017  1:14 PM CDT -----  Contact: pt called  Stop eliquis one day prior  Stop flecainide 3 days prior, cartia 2 days prior    MB  ----- Message -----     From: Geneva Doty RN     Sent: 4/11/2017   2:16 PM       To: Chris Fam MD    Pt's current meds are now Cartia XT, and Eliquis and Flecainide. Please advise on what to stop and when so I can send instructions to pt. PVI scheduled 5/8/17  ----- Message -----     From: Lu Dougherty     Sent: 4/11/2017  12:54 PM       To: [facility] Arrhythmia Rn Staff    Pt called, requesting to speak with Geneva in regards to his upcoming ablation. He states he will like to be seen sooner. Ph for pt is [phone]. Thank you

## 2017-04-19 ENCOUNTER — TELEPHONE (OUTPATIENT)
Dept: CARDIOLOGY | Facility: HOSPITAL | Age: 47
End: 2017-04-19

## 2017-04-19 DIAGNOSIS — I48.91 ATRIAL FIBRILLATION: Primary | ICD-10-CM

## 2017-04-19 NOTE — TELEPHONE ENCOUNTER
"ABLATION EDUCATION CHECKLIST  4/20/2017 @ 12:35 PM  Pre-procedure labs have been ordered for you @ [facility] "[facility]  Be sure to arrive at your scheduled time for this lab work!  You do not have to fast for this lab work!    5/8/2017 @ 8 AM  Report to Cardiology Waiting Room on 3rd floor of the Hospital    (Do not report to clinic)  Directions for Reporting to Cardiology Waiting Area in the Hospital  If you park in the Parking Garage:  Take elevators to the 2nd floor  Walk up ramp and turn right by Gold Elevators  Take elevator to the 3rd floor  Upon exiting the elevator, turn away from the clinic areas  Walk long martinez around to front of hospital to area with windows overlooking [facility]  Check in at Reception Desk  OR  If family is dropping you off:  Have them drop you off at the front of the Hospital  (Near the ER, where all the flags are hung).  Take the E elevators to the 3rd floor.  Check in at the Reception Desk in the waiting room.    Do not eat or drink anything after: 12 mn on the night before your procedure    Medications:   HOLD Eliquis 1 day prior to procedure. (last dose evening of 5/6/2017)  HOLD Flecainide 3 days Prior to procedure. (last dose 5/4/2017)  HOLD Cartia 2 days Prior to procedure (last dose 5/5/2017)  You may take your usual morning medications with a sip of water    You will be spending the night after your procedure  You will need someone to drive you home the day after your procedure.    Your pain during your procedure will be managed by the anesthesia team.     THE ABOVE INSTRUCTIONS WERE GIVEN TO THE PATIENT VERBALLY AND THEY VERBALIZED UNDERSTANDING.  THEY DO NOT REQUIRE ANY SPECIAL NEEDS AND DO NOT HAVE ANY LEARNING BARRIERS.    Any need to reschedule or cancel procedures, or any questions regarding your procedures should be addressed directly with the Arrhythmia Department Nurses at the following phone number: [phone]        "

## 2017-04-20 ENCOUNTER — LAB VISIT (OUTPATIENT)
Dept: LAB | Facility: HOSPITAL | Age: 47
End: 2017-04-20
Payer: COMMERCIAL

## 2017-04-20 DIAGNOSIS — I48.91 ATRIAL FIBRILLATION: ICD-10-CM

## 2017-04-20 LAB
ANION GAP SERPL CALC-SCNC: 6 MMOL/L
APTT BLDCRRT: 26.7 SEC
BASOPHILS # BLD AUTO: 0.02 K/UL
BASOPHILS NFR BLD: 0.3 %
BUN SERPL-MCNC: 20 MG/DL
CALCIUM SERPL-MCNC: 9.6 MG/DL
CHLORIDE SERPL-SCNC: 102 MMOL/L
CO2 SERPL-SCNC: 30 MMOL/L
CREAT SERPL-MCNC: 1.2 MG/DL
DIFFERENTIAL METHOD: ABNORMAL
EOSINOPHIL # BLD AUTO: 0.2 K/UL
EOSINOPHIL NFR BLD: 2.5 %
ERYTHROCYTE [DISTWIDTH] IN BLOOD BY AUTOMATED COUNT: 13.5 %
EST. GFR  (AFRICAN AMERICAN): >60 ML/MIN/1.73 M^2
EST. GFR  (NON AFRICAN AMERICAN): >60 ML/MIN/1.73 M^2
GLUCOSE SERPL-MCNC: 73 MG/DL
HCT VFR BLD AUTO: 43.5 %
HGB BLD-MCNC: 15.2 G/DL
INR PPP: 1
LYMPHOCYTES # BLD AUTO: 1.6 K/UL
LYMPHOCYTES NFR BLD: 22.5 %
MCH RBC QN AUTO: 31.2 PG
MCHC RBC AUTO-ENTMCNC: 34.9 %
MCV RBC AUTO: 89 FL
MONOCYTES # BLD AUTO: 0.7 K/UL
MONOCYTES NFR BLD: 10.4 %
NEUTROPHILS # BLD AUTO: 4.4 K/UL
NEUTROPHILS NFR BLD: 64.2 %
PLATELET # BLD AUTO: 238 K/UL
PMV BLD AUTO: 8.6 FL
POTASSIUM SERPL-SCNC: 4.3 MMOL/L
PROTHROMBIN TIME: 10.4 SEC
RBC # BLD AUTO: 4.87 M/UL
SODIUM SERPL-SCNC: 138 MMOL/L
WBC # BLD AUTO: 6.9 K/UL

## 2017-04-20 PROCEDURE — 85025 COMPLETE CBC W/AUTO DIFF WBC: CPT

## 2017-04-20 PROCEDURE — 36415 COLL VENOUS BLD VENIPUNCTURE: CPT

## 2017-04-20 PROCEDURE — 80048 BASIC METABOLIC PNL TOTAL CA: CPT

## 2017-04-20 PROCEDURE — 85610 PROTHROMBIN TIME: CPT

## 2017-04-20 PROCEDURE — 85730 THROMBOPLASTIN TIME PARTIAL: CPT

## 2017-04-21 ENCOUNTER — HOSPITAL ENCOUNTER (OUTPATIENT)
Dept: RADIOLOGY | Facility: HOSPITAL | Age: 47
Discharge: HOME | End: 2017-04-21
Attending: INTERNAL MEDICINE
Payer: COMMERCIAL

## 2017-04-21 DIAGNOSIS — I48.0 PAROXYSMAL ATRIAL FIBRILLATION: ICD-10-CM

## 2017-04-21 PROCEDURE — 75572 CT HRT W/3D IMAGE: CPT | Mod: TC

## 2017-04-21 PROCEDURE — 75572 CT HRT W/3D IMAGE: CPT | Mod: 26,,, | Performed by: RADIOLOGY

## 2017-04-21 PROCEDURE — 25500020 PHARM REV CODE 255: Performed by: INTERNAL MEDICINE

## 2017-04-21 RX ADMIN — IOHEXOL 100 ML: 350 INJECTION, SOLUTION INTRAVENOUS at 08:04

## 2017-04-28 ENCOUNTER — TELEPHONE (OUTPATIENT)
Dept: CARDIOLOGY | Facility: CLINIC | Age: 47
End: 2017-04-28

## 2017-05-01 ENCOUNTER — TELEPHONE (OUTPATIENT)
Dept: CARDIOLOGY | Facility: CLINIC | Age: 47
End: 2017-05-01

## 2017-05-01 NOTE — TELEPHONE ENCOUNTER
Patient called about LOY scheduled on 5/8/17  . Pre-procedural questions asked.  Denies swallowing issues and esophageal issues. Denies previous sedation/anesthesia problems. States does not snore or have sleep apnea.  Denies recent trauma/surgery/radiation therapy to head/neck/airway. States is able to move neck without difficulty. LOY described to patient. Instructed NPO past midnight and to have a designated  to drive patient home after the procedures due to sedation being given. Instructed to report to   sscu at 0800 am.  Verbalizes understanding. Questions answered.

## 2017-05-08 ENCOUNTER — SURGERY (OUTPATIENT)
Age: 47
End: 2017-05-08

## 2017-05-08 ENCOUNTER — HOSPITAL ENCOUNTER (OUTPATIENT)
Dept: CARDIOLOGY | Facility: CLINIC | Age: 47
Discharge: HOME | End: 2017-05-08
Payer: COMMERCIAL

## 2017-05-08 ENCOUNTER — HOSPITAL ENCOUNTER (OUTPATIENT)
Facility: HOSPITAL | Age: 47
Discharge: HOME | End: 2017-05-09
Attending: INTERNAL MEDICINE | Admitting: INTERNAL MEDICINE
Payer: COMMERCIAL

## 2017-05-08 ENCOUNTER — ANESTHESIA EVENT (OUTPATIENT)
Dept: MEDSURG UNIT | Facility: HOSPITAL | Age: 47
End: 2017-05-08
Payer: COMMERCIAL

## 2017-05-08 ENCOUNTER — ANESTHESIA (OUTPATIENT)
Dept: MEDSURG UNIT | Facility: HOSPITAL | Age: 47
End: 2017-05-08
Payer: COMMERCIAL

## 2017-05-08 DIAGNOSIS — I48.91 ATRIAL FIBRILLATION: ICD-10-CM

## 2017-05-08 DIAGNOSIS — R07.9 CHEST PAIN: ICD-10-CM

## 2017-05-08 LAB
ABO + RH BLD: NORMAL
BLD GP AB SCN CELLS X3 SERPL QL: NORMAL
POC ACTIVATED CLOTTING TIME K: 152 SEC (ref 74–137)
SAMPLE: ABNORMAL

## 2017-05-08 PROCEDURE — 93613 INTRACARDIAC EPHYS 3D MAPG: CPT | Mod: ,,, | Performed by: INTERNAL MEDICINE

## 2017-05-08 PROCEDURE — 93656 COMPRE EP EVAL ABLTJ ATR FIB: CPT | Mod: ,,, | Performed by: INTERNAL MEDICINE

## 2017-05-08 PROCEDURE — 36620 INSERTION CATHETER ARTERY: CPT | Mod: 59,,, | Performed by: ANESTHESIOLOGY

## 2017-05-08 PROCEDURE — 27201037 HC PRESSURE MONITORING SET UP

## 2017-05-08 PROCEDURE — 25000003 PHARM REV CODE 250: Performed by: INTERNAL MEDICINE

## 2017-05-08 PROCEDURE — 25000003 PHARM REV CODE 250: Performed by: NURSE ANESTHETIST, CERTIFIED REGISTERED

## 2017-05-08 PROCEDURE — 63600175 PHARM REV CODE 636 W HCPCS: Performed by: NURSE ANESTHETIST, CERTIFIED REGISTERED

## 2017-05-08 PROCEDURE — 25000003 PHARM REV CODE 250

## 2017-05-08 PROCEDURE — 93010 ELECTROCARDIOGRAM REPORT: CPT | Mod: 76,,, | Performed by: INTERNAL MEDICINE

## 2017-05-08 PROCEDURE — 37000008 HC ANESTHESIA 1ST 15 MINUTES: Performed by: INTERNAL MEDICINE

## 2017-05-08 PROCEDURE — 93662 INTRACARDIAC ECG (ICE): CPT

## 2017-05-08 PROCEDURE — 37000009 HC ANESTHESIA EA ADD 15 MINS: Performed by: INTERNAL MEDICINE

## 2017-05-08 PROCEDURE — 93005 ELECTROCARDIOGRAM TRACING: CPT

## 2017-05-08 PROCEDURE — 86850 RBC ANTIBODY SCREEN: CPT | Mod: 91

## 2017-05-08 PROCEDURE — 25500020 PHARM REV CODE 255

## 2017-05-08 PROCEDURE — 86900 BLOOD TYPING SEROLOGIC ABO: CPT

## 2017-05-08 PROCEDURE — D9220A PRA ANESTHESIA: Mod: CRNA,,, | Performed by: NURSE ANESTHETIST, CERTIFIED REGISTERED

## 2017-05-08 PROCEDURE — 93010 ELECTROCARDIOGRAM REPORT: CPT | Mod: ,,, | Performed by: INTERNAL MEDICINE

## 2017-05-08 PROCEDURE — D9220A PRA ANESTHESIA: Mod: ANES,,, | Performed by: ANESTHESIOLOGY

## 2017-05-08 PROCEDURE — 93662 INTRACARDIAC ECG (ICE): CPT | Mod: 26,,, | Performed by: INTERNAL MEDICINE

## 2017-05-08 PROCEDURE — 36415 COLL VENOUS BLD VENIPUNCTURE: CPT

## 2017-05-08 RX ORDER — HEPARIN SODIUM 1000 [USP'U]/ML
INJECTION, SOLUTION INTRAVENOUS; SUBCUTANEOUS
Status: DISCONTINUED | OUTPATIENT
Start: 2017-05-08 | End: 2017-05-08

## 2017-05-08 RX ORDER — FLECAINIDE ACETATE 50 MG/1
100 TABLET ORAL EVERY 12 HOURS PRN
Status: DISCONTINUED | OUTPATIENT
Start: 2017-05-08 | End: 2017-05-09 | Stop reason: HOSPADM

## 2017-05-08 RX ORDER — ROCURONIUM BROMIDE 10 MG/ML
INJECTION, SOLUTION INTRAVENOUS
Status: DISCONTINUED | OUTPATIENT
Start: 2017-05-08 | End: 2017-05-08

## 2017-05-08 RX ORDER — SODIUM CHLORIDE 9 MG/ML
INJECTION, SOLUTION INTRAVENOUS CONTINUOUS PRN
Status: DISCONTINUED | OUTPATIENT
Start: 2017-05-08 | End: 2017-05-08

## 2017-05-08 RX ORDER — PANTOPRAZOLE SODIUM 40 MG/1
40 TABLET, DELAYED RELEASE ORAL DAILY
Status: DISCONTINUED | OUTPATIENT
Start: 2017-05-08 | End: 2017-05-09 | Stop reason: HOSPADM

## 2017-05-08 RX ORDER — LEVOTHYROXINE SODIUM 50 UG/1
50 TABLET ORAL NIGHTLY
Status: DISCONTINUED | OUTPATIENT
Start: 2017-05-08 | End: 2017-05-09 | Stop reason: HOSPADM

## 2017-05-08 RX ORDER — PROPOFOL 10 MG/ML
VIAL (ML) INTRAVENOUS
Status: DISCONTINUED | OUTPATIENT
Start: 2017-05-08 | End: 2017-05-08

## 2017-05-08 RX ORDER — FENTANYL CITRATE 50 UG/ML
INJECTION, SOLUTION INTRAMUSCULAR; INTRAVENOUS
Status: DISCONTINUED | OUTPATIENT
Start: 2017-05-08 | End: 2017-05-08

## 2017-05-08 RX ORDER — PROTAMINE SULFATE 10 MG/ML
INJECTION, SOLUTION INTRAVENOUS
Status: DISCONTINUED | OUTPATIENT
Start: 2017-05-08 | End: 2017-05-08

## 2017-05-08 RX ORDER — PHENYLEPHRINE HYDROCHLORIDE 10 MG/ML
INJECTION INTRAVENOUS
Status: DISCONTINUED | OUTPATIENT
Start: 2017-05-08 | End: 2017-05-08

## 2017-05-08 RX ORDER — MIDAZOLAM HYDROCHLORIDE 1 MG/ML
INJECTION, SOLUTION INTRAMUSCULAR; INTRAVENOUS
Status: DISCONTINUED | OUTPATIENT
Start: 2017-05-08 | End: 2017-05-08

## 2017-05-08 RX ORDER — SUCCINYLCHOLINE CHLORIDE 20 MG/ML
INJECTION INTRAMUSCULAR; INTRAVENOUS
Status: DISCONTINUED | OUTPATIENT
Start: 2017-05-08 | End: 2017-05-08

## 2017-05-08 RX ORDER — DILTIAZEM HYDROCHLORIDE 120 MG/1
120 CAPSULE, COATED, EXTENDED RELEASE ORAL DAILY
Status: DISCONTINUED | OUTPATIENT
Start: 2017-05-08 | End: 2017-05-09 | Stop reason: HOSPADM

## 2017-05-08 RX ORDER — POTASSIUM CHLORIDE 20 MEQ/1
20 TABLET, EXTENDED RELEASE ORAL NIGHTLY
Status: DISCONTINUED | OUTPATIENT
Start: 2017-05-08 | End: 2017-05-09 | Stop reason: HOSPADM

## 2017-05-08 RX ORDER — LIDOCAINE HCL/PF 100 MG/5ML
SYRINGE (ML) INTRAVENOUS
Status: DISCONTINUED | OUTPATIENT
Start: 2017-05-08 | End: 2017-05-08

## 2017-05-08 RX ADMIN — PROTAMINE SULFATE 10 MG: 10 INJECTION, SOLUTION INTRAVENOUS at 02:05

## 2017-05-08 RX ADMIN — PHENYLEPHRINE HYDROCHLORIDE 100 MCG: 10 INJECTION INTRAVENOUS at 01:05

## 2017-05-08 RX ADMIN — PHENYLEPHRINE HYDROCHLORIDE 100 MCG: 10 INJECTION INTRAVENOUS at 12:05

## 2017-05-08 RX ADMIN — FENTANYL CITRATE 50 MCG: 50 INJECTION, SOLUTION INTRAMUSCULAR; INTRAVENOUS at 11:05

## 2017-05-08 RX ADMIN — PROPOFOL 100 MG: 10 INJECTION, EMULSION INTRAVENOUS at 01:05

## 2017-05-08 RX ADMIN — SODIUM CHLORIDE: 0.9 INJECTION, SOLUTION INTRAVENOUS at 11:05

## 2017-05-08 RX ADMIN — POTASSIUM CHLORIDE 20 MEQ: 1500 TABLET, EXTENDED RELEASE ORAL at 08:05

## 2017-05-08 RX ADMIN — HEPARIN SODIUM 10000 UNITS: 1000 INJECTION INTRAVENOUS; SUBCUTANEOUS at 12:05

## 2017-05-08 RX ADMIN — PROPOFOL 50 MG: 10 INJECTION, EMULSION INTRAVENOUS at 12:05

## 2017-05-08 RX ADMIN — LIDOCAINE HYDROCHLORIDE 40 MG: 20 INJECTION, SOLUTION INTRAVENOUS at 11:05

## 2017-05-08 RX ADMIN — FENTANYL CITRATE 100 MCG: 50 INJECTION, SOLUTION INTRAMUSCULAR; INTRAVENOUS at 11:05

## 2017-05-08 RX ADMIN — PROPOFOL 150 MG: 10 INJECTION, EMULSION INTRAVENOUS at 11:05

## 2017-05-08 RX ADMIN — MIDAZOLAM HYDROCHLORIDE 2 MG: 1 INJECTION INTRAMUSCULAR; INTRAVENOUS at 11:05

## 2017-05-08 RX ADMIN — HEPARIN SODIUM 6000 UNITS: 1000 INJECTION INTRAVENOUS; SUBCUTANEOUS at 01:05

## 2017-05-08 RX ADMIN — SUCCINYLCHOLINE CHLORIDE 120 MG: 20 INJECTION, SOLUTION INTRAMUSCULAR; INTRAVENOUS at 11:05

## 2017-05-08 RX ADMIN — PHENYLEPHRINE HYDROCHLORIDE 100 MCG: 10 INJECTION INTRAVENOUS at 02:05

## 2017-05-08 RX ADMIN — DILTIAZEM HYDROCHLORIDE 120 MG: 120 CAPSULE, EXTENDED RELEASE ORAL at 05:05

## 2017-05-08 RX ADMIN — PANTOPRAZOLE SODIUM 40 MG: 40 TABLET, DELAYED RELEASE ORAL at 03:05

## 2017-05-08 RX ADMIN — HEPARIN SODIUM 4000 UNITS: 1000 INJECTION INTRAVENOUS; SUBCUTANEOUS at 01:05

## 2017-05-08 RX ADMIN — MIDAZOLAM HYDROCHLORIDE 3 MG: 1 INJECTION INTRAMUSCULAR; INTRAVENOUS at 11:05

## 2017-05-08 RX ADMIN — APIXABAN 5 MG: 2.5 TABLET, FILM COATED ORAL at 09:05

## 2017-05-08 RX ADMIN — ROCURONIUM BROMIDE 5 MG: 10 INJECTION, SOLUTION INTRAVENOUS at 11:05

## 2017-05-08 RX ADMIN — SODIUM CHLORIDE, SODIUM GLUCONATE, SODIUM ACETATE, POTASSIUM CHLORIDE, MAGNESIUM CHLORIDE, SODIUM PHOSPHATE, DIBASIC, AND POTASSIUM PHOSPHATE: .53; .5; .37; .037; .03; .012; .00082 INJECTION, SOLUTION INTRAVENOUS at 11:05

## 2017-05-08 RX ADMIN — SODIUM CHLORIDE, SODIUM GLUCONATE, SODIUM ACETATE, POTASSIUM CHLORIDE, MAGNESIUM CHLORIDE, SODIUM PHOSPHATE, DIBASIC, AND POTASSIUM PHOSPHATE: .53; .5; .37; .037; .03; .012; .00082 INJECTION, SOLUTION INTRAVENOUS at 01:05

## 2017-05-08 NOTE — ANESTHESIA PREPROCEDURE EVALUATION
05/08/2017  Manny Flores is a 47 y.o., male.  Patient Active Problem List   Diagnosis    SVT (supraventricular tachycardia)    Paroxysmal atrial fibrillation    Atrial fibrillation         Anesthesia Evaluation         Review of Systems      Physical Exam  General:  Well nourished    Airway/Jaw/Neck:  Airway Findings: Mouth Opening: Normal Tongue: Normal  General Airway Assessment: Adult  Mallampati: II  Improves to II with phonation.  TM Distance: Normal, at least 6 cm      Dental:  Dental Findings: In tact   Chest/Lungs:  Chest/Lungs Findings: Clear to auscultation     Heart/Vascular:  Heart Findings: Rate: Normal  Rhythm: Regular Rhythm  Sounds: Normal        Mental Status:  Mental Status Findings:  Cooperative, Alert and Oriented         Anesthesia Plan  Type of Anesthesia, risks & benefits discussed:  Anesthesia Type:  general  Patient's Preference: General  Intra-op Monitoring Plan: standard ASA monitors and arterial line  Intra-op Monitoring Plan Comments: Standard ASA monitors and arterial line.   Post Op Pain Control Plan:   Post Op Pain Control Plan Comments: Per primary service.     Induction:   IV  Beta Blocker:  Patient is not currently on a Beta-Blocker (No further documentation required).       Informed Consent: Patient understands risks and agrees with Anesthesia plan.  Questions answered. Anesthesia consent signed with patient.  ASA Score: 2     Day of Surgery Review of History & Physical:    H&P update referred to the surgeon.     Anesthesia Plan Notes: Chart reviewed, patient interviewed and examined.  The plan for general anesthesia was explained.  Questions were answered and the consent was signed.  Tamir JARVIS         Ready For Surgery From Anesthesia Perspective.

## 2017-05-08 NOTE — TRANSFER OF CARE
"Anesthesia Transfer of Care Note    Patient: Manny Flores    Procedure(s) Performed: Procedure(s) (LRB):  ABLATION (N/A)  TRANSESOPHAGEAL ECHOCARDIOGRAM (LOY) (N/A)    Patient location: PACU    Anesthesia Type: general    Transport from OR: Transported from OR on 2-3 L/min O2 by NC with adequate spontaneous ventilation    Post pain: adequate analgesia    Post assessment: no apparent anesthetic complications and tolerated procedure well    Post vital signs: stable    Level of consciousness: awake, alert and oriented    Nausea/Vomiting: no nausea/vomiting    Complications: none    Transfer of care protocol was followed      Last vitals:   Visit Vitals    BP (!) 145/83 (BP Location: Right arm, Patient Position: Lying, BP Method: Automatic)    Pulse 67    Temp 36.6 °C (97.8 °F) (Oral)    Resp 20    Ht 5' 10" (1.778 m)    Wt 83.5 kg (184 lb)    SpO2 99%    BMI 26.4 kg/m2     "

## 2017-05-08 NOTE — ANESTHESIA RELEASE NOTE
"Anesthesia Release from PACU Note    Patient: Manny Flores    Procedure(s) Performed: Procedure(s) (LRB):  ABLATION (N/A)  TRANSESOPHAGEAL ECHOCARDIOGRAM (LOY) (N/A)    Anesthesia type: general    Post pain: Adequate analgesia    Post assessment: no apparent anesthetic complications and tolerated procedure well    Last Vitals:   Visit Vitals    /80    Pulse 80    Temp 36.1 °C (97 °F) (Oral)    Resp 15    Ht 5' 10" (1.778 m)    Wt 83.5 kg (184 lb)    SpO2 99%    BMI 26.4 kg/m2       Post vital signs: stable    Level of consciousness: awake and alert     Nausea/Vomiting: no nausea/no vomiting    Complications: none    Airway Patency: patent    Respiratory: unassisted, spontaneous ventilation, room air    Cardiovascular: stable and blood pressure at baseline    Hydration: euvolemic  "

## 2017-05-08 NOTE — ANESTHESIA PROCEDURE NOTES
Arterial    Diagnosis: a-fib    Patient location during procedure: done in OR  Procedure start time: 5/8/2017 11:45 AM  Staffing  Anesthesiologist: HEBER TEJADA  Resident/CRNA: LOMBARDI, NICOLE A.  Performed by: resident/CRNA   Anesthesiologist was present at the time of the procedure.  Preanesthetic Checklist  Completed: patient identified, site marked, surgical consent, pre-op evaluation, timeout performed, IV checked, risks and benefits discussed, monitors and equipment checked and anesthesia consent given  Arterial Line  Skin Prep: chlorhexidine gluconate  Local Infiltration: none  Orientation: right  Location: radial  Catheter Size: 20 G{[facility] ANESTHESIA BLOCK ART PLACEMENTInsertion Attempts: 1  Assessment  Dressing: secured with tape and tegaderm

## 2017-05-08 NOTE — PLAN OF CARE
Received report from KERI Barbosa. Pt received from Cath lab via stretcher. Patient  AAOx3. VSS, no distress noted. Resp even and unlabored. Bilateral groin dressing C/D/I. +pulses, No active bleeding. No hematoma noted. Post procedure protocol reviewed with patient and patient's family. Understanding verbalized. Family members at bedside. Nurse call bell within reach. Will continue to monitor per post procedure protocol.

## 2017-05-08 NOTE — IP AVS SNAPSHOT
[facility]  [address]  [facility] [address]  Phone: [phone]           Patient Discharge Instructions   Our goal is to set you up for success. This packet includes information on your condition, medications, and your home care.  It will help you care for yourself to prevent having to return to the hospital.     Please ask your nurse if you have any questions.      There are many details to remember when preparing to leave the hospital. Here is what you will need to do:    1. Take your medicine. If you are prescribed medications, review your Medication List on the following pages. You may have new medications to  at the pharmacy and others that you'll need to stop taking. Review the instructions for how and when to take your medications. Talk with your doctor or nurses if you are unsure of what to do.     2. Go to your follow-up appointments. Specific follow-up information is listed in the following pages. Your may be contacted by a nurse or clinical provider about future appointments. Be sure we have all of the phone numbers to reach you. Please contact your provider's office if you are unable to make an appointment.     3. Watch for warning signs. Your doctor or nurse will give you detailed warning signs to watch for and when to call for assistance. These instructions may also include educational information about your condition. If you experience any of warning signs to your health, call your doctor.           [facility]  Unless otherwise directed by your provider, please   contact [facility], our nurse care line   that is available for 24/7 assistance.     [phone] (toll-free)     Registered nurses in the [facility]   provide: appointment scheduling, clinical advisement, health education, and other advisory services.                  ** Verify the list of medication(s) below is accurate and up to date. Carry this with you in case of  emergency. If your medications have changed, please notify your healthcare provider.             Medication List      START taking these medications        Additional Info                      pantoprazole 40 MG tablet   Commonly known as:  PROTONIX   Quantity:  30 tablet   Refills:  0   Dose:  40 mg    Last time this was given:  40 mg on 5/8/2017  3:44 PM   Instructions:  Take 1 tablet (40 mg total) by mouth once daily.     Begin Date    AM    Noon    PM    Bedtime         CONTINUE taking these medications        Additional Info                      apixaban 5 mg Tab   Refills:  0   Dose:  5 mg    Last time this was given:  5 mg on 5/8/2017  9:02 PM   Instructions:  Take 5 mg by mouth 2 (two) times daily.     Begin Date    AM    Noon    PM    Bedtime       CARTIA  MG Cp24   Refills:  0   Dose:  120 mg   Generic drug:  diltiaZEM    Last time this was given:  120 mg on 5/8/2017  5:41 PM   Instructions:  Take 120 mg by mouth once daily.     Begin Date    AM    Noon    PM    Bedtime       flecainide 100 MG Tab   Commonly known as:  TAMBOCOR   Quantity:  60 tablet   Refills:  11   Dose:  100 mg    Instructions:  Take 1 tablet (100 mg total) by mouth every 12 (twelve) hours as needed (as needed for AF/palpitations).     Begin Date    AM    Noon    PM    Bedtime       KLOR-CON M20 20 MEQ tablet   Refills:  0   Dose:  20 mEq   Generic drug:  potassium chloride SA    Last time this was given:  20 mEq on 5/8/2017  8:14 PM   Instructions:  Take 20 mEq by mouth every evening.     Begin Date    AM    Noon    PM    Bedtime       SYNTHROID 50 MCG tablet   Refills:  0   Dose:  50 mcg   Generic drug:  levothyroxine    Instructions:  Take 50 mcg by mouth every evening.     Begin Date    AM    Noon    PM    Bedtime            Where to Get Your Medications      These medications were sent to [id number] - [place], [place] - [address]  [address] [address]     Phone:  [phone]      pantoprazole 40 MG tablet                  Please bring to all follow up appointments:    1. A copy of your discharge instructions.  2. All medicines you are currently taking in their original bottles.  3. Identification and insurance card.    Please arrive 15 minutes ahead of scheduled appointment time.    Please call 24 hours in advance if you must reschedule your appointment and/or time.        Follow-up Information     Follow up with Chris Fam MD In 6 weeks.    Specialties:  Electrophysiology, Cardiovascular Disease    Contact information:    [address]  [facility] [address]  [phone]            Discharge Instructions       1. Do not strain or lift anything greater than 5 lb for 3 days.   2. Do not drive or operate any dangerous machinery for 24 hours.   3. Keep the dressing on, clean, and dry for 24 hours.   4. After 24 hours, the dressing may be removed and a shower is allowed.   5. Clean the area with mild soap and water.   6. Once the skin has healed (3 days), bathing in a tub or swimming is allowed.   7. Inspect the groin site daily and report to the physician any signs of infection at the site: redness, pain, fever >100.4, unusual pain at the   access site or affected extremity, unusual swelling at the access site, or any yellow, white or green drainage.  Call 911 if you have:   Bleeding from the puncture site that you cannot stop by doing the following:   Relax and lie down right away. Keep your leg flat and apply firm pressure to the site using your fingers and a gauze pad. Keep the pressure on for 10 minutes. Continue this until the bleeding stops. This may take awhile. When bleeding stops, cover the site with a sterile bandage and keep your leg still as much as possible.      Primary Diagnosis     Your primary diagnosis was:  Atrial Fibrillation (Irregular Heartbeat)      Admission Information     Date & Time Provider Department CSN    5/8/2017  7:34 AM Chris Fam MD  "[facility] [id number]      Care Providers     Provider Role Specialty Primary office phone    Chris Fam MD Attending Provider Electrophysiology [phone]    Chris Fam MD Surgeon  Electrophysiology [phone]      Your Vitals Were     BP Pulse Temp Resp Height Weight    114/69 (BP Location: Left arm, Patient Position: Lying, BP Method: Automatic) 77 98.2 °F (36.8 °C) (Oral) 18 5' 10" (1.778 m) 83.5 kg (184 lb)    SpO2 BMI             98% 26.4 kg/m2         Recent Lab Values     No lab values to display.      Pending Labs     Order Current Status    Type & Screen In process      Allergies as of 5/9/2017     No Known Allergies      Advance Directives     An advance directive is a document which, in the event you are no longer able to make decisions for yourself, tells your healthcare team what kind of treatment you do or do not want to receive, or who you would like to make those decisions for you.  If you do not currently have an advance directive, [facility] encourages you to create one.  For more information call:  [phone]), [phone] ([phone]),  or log on to [email or web address].        Language Assistance Services     ATTENTION: Language assistance services are available, free of charge. Please call [phone].      ATENCIÓN: Si habla español, tiene a tracy disposición servicios gratuitos de asistencia lingüística. Llame al [phone].     ALBERT Ý: N?u b?n nói Ti?ng Vi?t, có các d?ch v? h? tr? ngôn ng? mi?n phí dành cho b?n. G?i s? [phone].        Eliquis Informaiton            [facility] complies with applicable Federal civil rights laws and does not discriminate on the basis of race, color, national origin, age, disability, or sex.        "

## 2017-05-08 NOTE — NURSING TRANSFER
Nursing Transfer Note      5/8/2017     Transfer To: 314    Transfer via stretcher    Transfer with cardiac monitoring    Transported by RN    Medicines sent: no    Chart send with patient: Yes    Notified: spouse    Patient reassessed at: 5/8/17@1600hrs

## 2017-05-08 NOTE — ANESTHESIA POSTPROCEDURE EVALUATION
"Anesthesia Post Evaluation    Patient: Manny Flores    Procedure(s) Performed: Procedure(s) (LRB):  ABLATION (N/A)  TRANSESOPHAGEAL ECHOCARDIOGRAM (LOY) (N/A)    Final Anesthesia Type: general  Patient location during evaluation: Cath Lab  Patient participation: Yes- Able to Participate  Level of consciousness: awake and alert  Post-procedure vital signs: reviewed and stable  Pain management: adequate  Airway patency: patent  PONV status at discharge: No PONV  Anesthetic complications: no      Cardiovascular status: hemodynamically stable  Respiratory status: unassisted, spontaneous ventilation and room air  Hydration status: euvolemic  Follow-up not needed.        Visit Vitals    /80    Pulse 80    Temp 36.1 °C (97 °F) (Oral)    Resp 15    Ht 5' 10" (1.778 m)    Wt 83.5 kg (184 lb)    SpO2 99%    BMI 26.4 kg/m2       Pain/Silas Score: Pain Assessment Performed: Yes (5/8/2017  3:00 PM)  Presence of Pain: denies (5/8/2017  3:00 PM)  Silas Score: 8 (5/8/2017  3:00 PM)      "

## 2017-05-08 NOTE — H&P
[facility]  Cardiology Electrophysiology  History and Physical     Patient Name: Manny Flores  MRN: [id number]  Admission Date: 5/8/2017  Attending Provider: Chris Fam MD  Principal Problem: Atrial fibrillation    Patient information was obtained from patient and Mrs Flores.     Subjective:     Chief Complaint:  Atrial Fibrillation      HPI Comments: 47 year old male with PMH of AF, SVT s/p EPS on 3/1/17   showed no AP, no dual AVN physiology. AF easily induced with A pacing. Pt currently on eliquis, cartia, and flecainide, reports symptoms w/ AF episodes, such as dizziness, palpitations, and HENRY. Last AF episode was about one month ago. Pt denies any chest pains, fevers/chills, passing out episodes. Pt presents today for planned outpatient PVI.      EKG today showed NSR 68 BPM,  ms, QRS 90 ms     Past Medical History:   Diagnosis Date    Atrial fibrillation     Hypertension     Supraventricular tachycardia     Thyroid disease        Past Surgical History:   Procedure Laterality Date    HERNIA REPAIR      mesh placed    wisdom teeth extracted            Review of patient's allergies indicates:  No Known Allergies     No current facility-administered medications on file prior to encounter.      Current Outpatient Prescriptions on File Prior to Encounter   Medication Sig Dispense Refill    KLOR-CON M20 20 mEq tablet Take 20 mEq by mouth every evening.       SYNTHROID 50 mcg tablet Take 50 mcg by mouth every evening.       flecainide (TAMBOCOR) 100 MG Tab Take 1 tablet (100 mg total) by mouth every 12 (twelve) hours as needed (as needed for AF/palpitations). 60 tablet 11       History reviewed. No pertinent family history.    Social History   Substance Use Topics    Smoking status: Never Smoker    Smokeless tobacco: Not on file    Alcohol use Yes      Comment: social events         Review of Systems   Constitution: Negative   for weakness and malaise/fatigue.    HENT: Negative for ear pain and tinnitus.   Eyes: Negative for blurred vision.   Cardiovascular: Positive for  dyspnea on exertion., palpitations when in atrial fibrillation. Denies syncope.   Respiratory:  Negative for shortness of breath currently  Endocrine:  Negative for polyuria.   Hematologic/Lymphatic: Negative for bruise/bleed easily.   Skin:  Negative for rash.   Musculoskeletal: Negative for joint pain and muscle weakness.   Gastrointestinal:  Negative for abdominal pain and change in bowel habit.   Neurological:  Positive  for dizziness, when in AF  Psychiatric/Behavioral:  Positive for  anxiety         Objective:     Temp: 97.8 °F (36.6 °C) (05/08/17 0800)  Pulse: 67 (05/08/17 0800)  Resp: 20 (05/08/17 0800)  BP: (!) 145/83 (05/08/17 0812)  SpO2: 99 % (05/08/17 0800)    Vital Signs (24h Range):  Temp:  [97.8 °F (36.6 °C)]   Pulse:  [67]   Resp:  [20]   BP: (145-151)/(81-83)   SpO2:  [99 %]       PE:   General: Well developed, well nourished. No distress on Room air   HEENT: Head is normocephalic, atraumatic;  Lungs: Clear to auscultation bilaterally.  No wheezing. Normal respiratory effort.  Cardiovascular:  S1 S2 Normal rate, regular rhythm and normal heart sounds.    PMI is not displaced  Extremities: No LE edema. Pulses 2+ and symmetric.   Abdomen: Abdomen is soft, non-tender non-distended with normal bowel sounds.  Skin: Skin color, texture, turgor normal. No rashes.  Musculoskeletal: normal range of motion   Neurologic: Normal strength and tone. No focal numbness or weakness.   Psychiatric: normal mood and affect.  behavior is normal. Alert and oriented X 3.      Significant Labs:   Lab Results   Component Value Date    WBC 6.90 04/20/2017    HGB 15.2 04/20/2017    HCT 43.5 04/20/2017    MCV 89 04/20/2017     04/20/2017     BMP  Lab Results   Component Value Date     04/20/2017    K 4.3 04/20/2017     04/20/2017    CO2 30 (H) 04/20/2017    BUN 20 04/20/2017    CREATININE 1.2  2017    CALCIUM 9.6 2017    ANIONGAP 6 (L) 2017    ESTGFRAFRICA >60.0 2017    EGFRNONAA >60.0 2017      Lab Results   Component Value Date    INR 1.0 2017    INR 1.0 2017       Significant Imaging:   EPS 3/01/17   CONCLUSIONS:  1.  Normal sinus node and AV node His-Purkinje function.  2.  No evidence of dual AV node physiology.  3.  No evidence of accessory pathway either antegrade or retrograde conduction.  4.  Induced atrial fibrillation with conversion to typical atrial flutter before termination.      EK2017 see HPI, EKG in NSR     Assessment and Plan:   47 year old male with PMH of  SVT s/p EPS 3/1/17  showed no AP, no dual AVN physiology. AF easily induced with A pacing,  PAF. Eliquis held prior to procedure, last dose 17 PM dose, cartia last dose 17, and flecainide last dose 17.     Cryoballon PVI  LOY canceled given EKG in NSR   Sedation per anesthesia/General     Prior to procedure, extensive discussion with patient regarding risks and benefits of  ablation, and patient  would like to proceed.  Risks of procedure include but are not limited to the risk of infection, bleeding, stroke, paralysis,  MI, death, embolism, perforation requiring emergency draining or surgery. The patient and spouse  voices understanding and all questions have been answered. No further questions/concerns voiced at this time.      Signed:  HARINI Ndiaye-BC  Cardiology Electrophysiology  NP   [facility]    Attending: MD Sarwat Raymond

## 2017-05-08 NOTE — PROCEDURES
Procedure Date: 5/8/17  Procedure Name: Pulmonary Vein Isolation  Procedure Indication: Symptomatic paroxysmal afib    : Chris Fam MD, PhD    Secondary : Ross Perea MD    Procedure Details: The patient was taken to the EP lab and underwent general anesthesia/intubation. The patient's bilateral groins were prepped and draped in usual sterile fashion. IV antibiotic was administered. Time out was performed. Bilateral groin areas overlying femoral veins were identified and anesthetized with 10cc of 1% lidocaine. Once the area was anesthetized properly an 1 8.5Fr and 2 7.5Fr sheaths were introduced into the left groin, and 1 7.5Fr sheath was introduced into the right groin. His, and CS electrode catheters were introduced. ICE catheter was also introduced. Preprocedure ICE survey showed no pericardial effusion. The 7.5Fr sheath in the right groin was exchanged over a wire for a long sheath. Transeptal puncture was made under ICE guidance. The patient was fully anticoagulated with IV heparin. The sheath was then exchanged for the 15Fr cryoballoon sheath after 12Fr dilator sheath was used to dilate the tract. This was done over an inoue wire. A cryoballoon with Lasso catheter was introduced and cryoablation was performed of each pulmonary vein. The right sided veins were ablated while the right phrenic nerve was being stimulated to pace the diaphragm with the His catheter repositioned in the SVC. The veins were then re-analyzed with the lasso catheter and demonstrated both entrance and exit block. 60mg of protamine was given after a test injection. A post-ablation ICE survey was done showing no pericardial effusion. Once ACT was <180sec all sheaths were removed with manual pressure held until hemostasis was achieved. Both sites were covered with sterile gauze and tegaderm bandage. There were no immediate complications.     Estimated blood loss: <50cc    Plan:  - Post groin access and  sedation monitoring  - Re-start Eliquis 5 mg PO bid 6 hours after hemostasis is achieved. Should there be any groin bleeding prior to resumption of eliquis, delay resumption until 6 hours after hemostasis is re-achieved.  - Pantoprazole 40 mg daily x 1 month  - EKG post-procedure  - strict bed rest x 4 hrs    Procedure performed with Dr Fam.    Ross Perea MD

## 2017-05-09 VITALS
WEIGHT: 184 LBS | TEMPERATURE: 98 F | SYSTOLIC BLOOD PRESSURE: 114 MMHG | OXYGEN SATURATION: 98 % | HEART RATE: 77 BPM | DIASTOLIC BLOOD PRESSURE: 69 MMHG | RESPIRATION RATE: 18 BRPM | BODY MASS INDEX: 26.34 KG/M2 | HEIGHT: 70 IN

## 2017-05-09 PROCEDURE — 93010 ELECTROCARDIOGRAM REPORT: CPT | Mod: ,,, | Performed by: INTERNAL MEDICINE

## 2017-05-09 PROCEDURE — 93005 ELECTROCARDIOGRAM TRACING: CPT | Mod: 59

## 2017-05-09 RX ORDER — PANTOPRAZOLE SODIUM 40 MG/1
40 TABLET, DELAYED RELEASE ORAL DAILY
Qty: 30 TABLET | Refills: 0 | Status: SHIPPED | OUTPATIENT
Start: 2017-05-09 | End: 2017-06-12

## 2017-05-09 NOTE — PROGRESS NOTES
Safeguard device removed from right groin. No bleeding, no hematoma. Patient ambulated in hallway without any complications. Will monitor.

## 2017-05-09 NOTE — DISCHARGE SUMMARY
[facility]  Discharge Summary      Admit Date: 5/8/2017    Discharge Date and Time:  05/09/2017 7:23 AM    Attending Physician: Chris Fam MD     Reason for Admission: PVI, cryoballoon    Procedures Performed: Procedure(s) (LRB):  ABLATION (N/A)  TRANSESOPHAGEAL ECHOCARDIOGRAM (LOY) (N/A)    Hospital Course 47 year old male with PMH of AF, SVT s/p EPS on 3/1/17  showed no AP, no dual AVN physiology, and AF was easily induced with A pacing. He successfully underwent a PVI, cryoballoon. There were no complications apparent at the time of discharge. Please see the full report in Epic for details. He will f/u with Dr Fam in 6 weeks.    Final Diagnoses:    Principal Problem: Atrial fibrillation   Secondary Diagnoses:   Active Hospital Problems    Diagnosis  POA    *Atrial fibrillation [I48.91]  Yes      Resolved Hospital Problems    Diagnosis Date Resolved POA   No resolved problems to display.       Discharged Condition: good    Disposition: Home or Self Care    Follow Up/Patient Instructions:     Medications:  Reconciled Home Medications:   Current Discharge Medication List      START taking these medications    Details   pantoprazole (PROTONIX) 40 MG tablet Take 1 tablet (40 mg total) by mouth once daily.  Qty: 30 tablet, Refills: 0         CONTINUE these medications which have NOT CHANGED    Details   KLOR-CON M20 20 mEq tablet Take 20 mEq by mouth every evening.       SYNTHROID 50 mcg tablet Take 50 mcg by mouth every evening.       apixaban 5 mg Tab Take 5 mg by mouth 2 (two) times daily.      diltiaZEM (CARTIA XT) 120 MG Cp24 Take 120 mg by mouth once daily.      flecainide (TAMBOCOR) 100 MG Tab Take 1 tablet (100 mg total) by mouth every 12 (twelve) hours as needed (as needed for AF/palpitations).  Qty: 60 tablet, Refills: 11           No discharge procedures on file.  Follow-up Information     Follow up with Chris Fam MD In 6 weeks.    Specialties:  Electrophysiology,  Cardiovascular Disease    Contact information:    [address]  [facility] [address]  [phone]

## 2017-05-12 LAB
POC ACTIVATED CLOTTING TIME K: 234 SEC (ref 74–137)
POC ACTIVATED CLOTTING TIME K: 296 SEC (ref 74–137)
POC ACTIVATED CLOTTING TIME K: 342 SEC (ref 74–137)
SAMPLE: ABNORMAL

## 2017-05-23 ENCOUNTER — PATIENT MESSAGE (OUTPATIENT)
Dept: CARDIOLOGY | Facility: CLINIC | Age: 47
End: 2017-05-23

## 2017-05-24 ENCOUNTER — PATIENT MESSAGE (OUTPATIENT)
Dept: CARDIOLOGY | Facility: CLINIC | Age: 47
End: 2017-05-24

## 2017-05-25 DIAGNOSIS — I47.10 SVT (SUPRAVENTRICULAR TACHYCARDIA): Primary | ICD-10-CM

## 2017-06-01 ENCOUNTER — TELEPHONE (OUTPATIENT)
Dept: ELECTROPHYSIOLOGY | Facility: CLINIC | Age: 47
End: 2017-06-01

## 2017-06-01 DIAGNOSIS — I47.10 SVT (SUPRAVENTRICULAR TACHYCARDIA): ICD-10-CM

## 2017-06-01 NOTE — TELEPHONE ENCOUNTER
----- Message from Sylvia Norris RN sent at 6/1/2017  3:54 PM CDT -----  Contact: Pt called      ----- Message -----  From: Lu Dougherty  Sent: 6/1/2017  12:35 PM  To: Geneva Doty RN    Pt called, requesting to speak with a nurse today in order to obtain a prior authorization for Eliquis.He states he has two pills left. Ph for pt is [phone]. Thank you

## 2017-06-08 DIAGNOSIS — I48.0 PAROXYSMAL ATRIAL FIBRILLATION: Primary | ICD-10-CM

## 2017-06-12 ENCOUNTER — CLINICAL SUPPORT (OUTPATIENT)
Dept: CARDIOLOGY | Facility: CLINIC | Age: 47
End: 2017-06-12
Payer: COMMERCIAL

## 2017-06-12 ENCOUNTER — OFFICE VISIT (OUTPATIENT)
Dept: CARDIOLOGY | Facility: CLINIC | Age: 47
End: 2017-06-12
Payer: COMMERCIAL

## 2017-06-12 VITALS
SYSTOLIC BLOOD PRESSURE: 138 MMHG | WEIGHT: 188.38 LBS | HEIGHT: 70 IN | BODY MASS INDEX: 26.97 KG/M2 | HEART RATE: 66 BPM | DIASTOLIC BLOOD PRESSURE: 80 MMHG

## 2017-06-12 DIAGNOSIS — I48.0 PAROXYSMAL ATRIAL FIBRILLATION: Primary | ICD-10-CM

## 2017-06-12 DIAGNOSIS — I48.0 PAROXYSMAL ATRIAL FIBRILLATION: ICD-10-CM

## 2017-06-12 PROCEDURE — 99999 PR PBB SHADOW E&M-EST. PATIENT-LVL III: CPT | Mod: PBBFAC,,, | Performed by: INTERNAL MEDICINE

## 2017-06-12 PROCEDURE — 93000 ELECTROCARDIOGRAM COMPLETE: CPT | Mod: S$GLB,,, | Performed by: NUCLEAR MEDICINE

## 2017-06-12 PROCEDURE — 99214 OFFICE O/P EST MOD 30 MIN: CPT | Mod: S$GLB,,, | Performed by: INTERNAL MEDICINE

## 2017-06-12 NOTE — PROGRESS NOTES
Subjective:    Patient ID:  Manny Flores is a 47 y.o. male who presents for follow-up of Atrial Fibrillation      47 yoM AF, SVT here for arrhythmia evaluation. He had post operative AF after a hernia surgery. At the time there was documented pre-excited AF. He was placed on diltiazem prn for symptomatic events. He has had several symptomatic palpitations over the next year. He underwent a treadmill test where he went into a narrow complex, short RP tachycardia 230 bpm moments into the recovery phase of his test. He felt rapid palpitations during this episode. He had several more non-sustained events. This episode matched his clinical symptoms. He was placed on long acting diltiazem and underwent an echo that showed normal EF and mild MR. He has CHADS VASC of 1 (HTN) and is on aspirin. He is here to discuss EPS/RFA    3/2/17: EPS yesterday showed no AP, no dual AVN physiology. AF easily induced with A pacing. Diltiazem started. Flecainide PRN offered but not taken.    4/17: He weaned himself off diltiazem. He had another AF/RVR episode that was symptomatic. He took diltiazem which broke the arrhythmia. He has stopped caffeine. He has had lifestyle changes. TSH was normal prior to the EPS. He wishes to proceed with PVI as opposed to AAD therapy.     Interval history: He underwent succeesful PVI 5/8/17 without complication. He had some palpitations but no sustained events. He remains on apixaban and diltiazem    Referring Physician: Kori Hawkins    Echo 2/1/17  LVEF 57%  Mild MR    Past Medical History:  No date: Atrial fibrillation  No date: Hypertension  No date: Supraventricular tachycardia  No date: Thyroid disease    Past Surgical History:  No date: HERNIA REPAIR      Comment: mesh placed  05/08/2017: RADIOFREQUENCY ABLATION      Comment: PVI, cryoballoon  No date: wisdom teeth extracted    Social History    Marital status:              Spouse name:                       Years of education:                  Number of children:               Occupational History    None on file    Social History Main Topics    Smoking status: Never Smoker                                                                   Smokeless tobacco: Not on file                       Alcohol use: Yes                Comment: social events    Drug use: No              Sexual activity: Yes                  Other Topics            Concern    None on file    Social History Narrative    None on file    No family history on file.        Atrial Fibrillation   Symptoms are negative for chest pain, dizziness, palpitations, shortness of breath and syncope. Past medical history includes atrial fibrillation.       Review of Systems   Constitution: Negative.   HENT: Negative.    Eyes: Negative.    Cardiovascular: Negative for chest pain, dyspnea on exertion, irregular heartbeat, leg swelling, near-syncope, palpitations and syncope.   Respiratory: Negative.  Negative for shortness of breath.    Endocrine: Negative.    Hematologic/Lymphatic: Negative.    Skin: Negative.    Musculoskeletal: Negative.    Gastrointestinal: Negative.    Genitourinary: Negative.    Neurological: Negative.  Negative for dizziness and light-headedness.   Psychiatric/Behavioral: Negative.    Allergic/Immunologic: Negative.         Objective:    Physical Exam   Constitutional: He is oriented to person, place, and time. He appears well-developed and well-nourished. No distress.   HENT:   Head: Normocephalic and atraumatic.   Eyes: EOM are normal. Pupils are equal, round, and reactive to light.   Neck: Normal range of motion. No JVD present. No thyromegaly present.   Cardiovascular: Normal rate, regular rhythm, S1 normal, S2 normal and normal heart sounds.  PMI is not displaced.  Exam reveals no gallop and no friction rub.    No murmur heard.  Pulmonary/Chest: Effort normal and breath sounds normal. No respiratory distress. He has no wheezes. He has no rales.   Abdominal:  Soft. Bowel sounds are normal. He exhibits no distension. There is no tenderness. There is no rebound and no guarding.   Musculoskeletal: Normal range of motion. He exhibits no edema or tenderness.   Neurological: He is alert and oriented to person, place, and time. No cranial nerve deficit.   Skin: Skin is warm and dry. No rash noted. No erythema.   Psychiatric: He has a normal mood and affect. His behavior is normal. Judgment and thought content normal.     ECG: NSR nl [place], QRs, QTc        Assessment:       1. Paroxysmal atrial fibrillation         Plan:       47 yoM pAf s/p PVI here for post ablation visit. Excellent results since ablation. Will continue diltiazem. Change from apixaban to aspirin at 2 month chastity (7/8/17). RTC 6m or as needed

## 2017-06-12 NOTE — PATIENT INSTRUCTIONS
Stop apixaban 7/8/17 and then start aspirin 81 mg once a day    If you have an AF recurrence, you can take flecainide 300 mg once (3 100 mg tablets)

## 2017-08-16 ENCOUNTER — PATIENT MESSAGE (OUTPATIENT)
Dept: CARDIOLOGY | Facility: CLINIC | Age: 47
End: 2017-08-16

## 2017-08-16 DIAGNOSIS — I47.10 SVT (SUPRAVENTRICULAR TACHYCARDIA): Primary | ICD-10-CM

## 2017-08-19 RX ORDER — DILTIAZEM HYDROCHLORIDE 120 MG/1
120 CAPSULE, COATED, EXTENDED RELEASE ORAL DAILY
Qty: 30 CAPSULE | Refills: 11 | Status: SHIPPED | OUTPATIENT
Start: 2017-08-19 | End: 2018-08-15 | Stop reason: SDUPTHER

## 2017-09-07 ENCOUNTER — PATIENT MESSAGE (OUTPATIENT)
Dept: CARDIOLOGY | Facility: CLINIC | Age: 47
End: 2017-09-07

## 2017-11-13 ENCOUNTER — CLINICAL SUPPORT (OUTPATIENT)
Dept: CARDIOLOGY | Facility: CLINIC | Age: 47
End: 2017-11-13
Payer: COMMERCIAL

## 2017-11-13 ENCOUNTER — OFFICE VISIT (OUTPATIENT)
Dept: CARDIOLOGY | Facility: CLINIC | Age: 47
End: 2017-11-13
Payer: COMMERCIAL

## 2017-11-13 VITALS
HEIGHT: 70 IN | HEART RATE: 68 BPM | WEIGHT: 191 LBS | DIASTOLIC BLOOD PRESSURE: 76 MMHG | BODY MASS INDEX: 27.35 KG/M2 | SYSTOLIC BLOOD PRESSURE: 118 MMHG

## 2017-11-13 DIAGNOSIS — I48.0 PAF (PAROXYSMAL ATRIAL FIBRILLATION): ICD-10-CM

## 2017-11-13 DIAGNOSIS — I48.0 PAROXYSMAL ATRIAL FIBRILLATION: Primary | ICD-10-CM

## 2017-11-13 DIAGNOSIS — I48.0 PAF (PAROXYSMAL ATRIAL FIBRILLATION): Primary | ICD-10-CM

## 2017-11-13 PROCEDURE — 93000 ELECTROCARDIOGRAM COMPLETE: CPT | Mod: S$GLB,,, | Performed by: INTERNAL MEDICINE

## 2017-11-13 PROCEDURE — 99999 PR PBB SHADOW E&M-EST. PATIENT-LVL II: CPT | Mod: PBBFAC,,, | Performed by: INTERNAL MEDICINE

## 2017-11-13 PROCEDURE — 99214 OFFICE O/P EST MOD 30 MIN: CPT | Mod: S$GLB,,, | Performed by: INTERNAL MEDICINE

## 2017-11-13 RX ORDER — ASPIRIN 81 MG/1
81 TABLET ORAL DAILY
COMMUNITY
End: 2019-08-14

## 2017-11-13 NOTE — PROGRESS NOTES
Subjective:    Patient ID:  Manny Flores is a 47 y.o. male who presents for follow-up of Atrial Fibrillation      47 yoM AF, SVT here for arrhythmia evaluation. He had post operative AF after a hernia surgery. At the time there was documented pre-excited AF. He was placed on diltiazem prn for symptomatic events. He has had several symptomatic palpitations over the next year. He underwent a treadmill test where he went into a narrow complex, short RP tachycardia 230 bpm moments into the recovery phase of his test. He felt rapid palpitations during this episode. He had several more non-sustained events. This episode matched his clinical symptoms. He was placed on long acting diltiazem and underwent an echo that showed normal EF and mild MR. He has CHADS VASC of 1 (HTN) and is on aspirin. He is here to discuss EPS/RFA    3/2/17: EPS yesterday showed no AP, no dual AVN physiology. AF easily induced with A pacing. Diltiazem started. Flecainide PRN offered but not taken.    4/17: He weaned himself off diltiazem. He had another AF/RVR episode that was symptomatic. He took diltiazem which broke the arrhythmia. He has stopped caffeine. He has had lifestyle changes. TSH was normal prior to the EPS. He wishes to proceed with PVI as opposed to AAD therapy.     6/12/17: He underwent succeesful PVI 5/8/17 without complication. He had some palpitations but no sustained events. He remains on apixaban and diltiazem    Interval history: Rare, brief palpitations. Remains on diltiazem and aspirin.     Referring Physician: Kori Hawkins    Echo 2/1/17  LVEF 57%  Mild MR    Past Medical History:  No date: Atrial fibrillation  No date: Hypertension  No date: Supraventricular tachycardia  No date: Thyroid disease    Past Surgical History:  No date: HERNIA REPAIR      Comment: mesh placed  05/08/2017: RADIOFREQUENCY ABLATION      Comment: PVI, cryoballoon  No date: wisdom teeth extracted    Social History    Marital status:               Spouse name:                       Years of education:                 Number of children:               Occupational History    None on file    Social History Main Topics    Smoking status: Never Smoker                                                                   Smokeless tobacco: Not on file                       Alcohol use: Yes                Comment: social events    Drug use: No              Sexual activity: Yes                  Other Topics            Concern    None on file    Social History Narrative    None on file    History reviewed.  No pertinent family history.          Review of Systems   Constitution: Negative.   HENT: Negative.    Eyes: Negative.    Cardiovascular: Negative for chest pain, dyspnea on exertion, irregular heartbeat, leg swelling, near-syncope, palpitations and syncope.   Respiratory: Negative.  Negative for shortness of breath.    Endocrine: Negative.    Hematologic/Lymphatic: Negative.    Skin: Negative.    Musculoskeletal: Negative.    Gastrointestinal: Negative.    Genitourinary: Negative.    Neurological: Negative.  Negative for dizziness and light-headedness.   Psychiatric/Behavioral: Negative.    Allergic/Immunologic: Negative.         Objective:    Physical Exam   Constitutional: He is oriented to person, place, and time. He appears well-developed and well-nourished. No distress.   HENT:   Head: Normocephalic and atraumatic.   Eyes: EOM are normal. Pupils are equal, round, and reactive to light.   Neck: Normal range of motion. No JVD present. No thyromegaly present.   Cardiovascular: Normal rate, regular rhythm, S1 normal, S2 normal and normal heart sounds.  PMI is not displaced.  Exam reveals no gallop and no friction rub.    No murmur heard.  Pulmonary/Chest: Effort normal and breath sounds normal. No respiratory distress. He has no wheezes. He has no rales.   Abdominal: Soft. Bowel sounds are normal. He exhibits no distension. There is no  tenderness. There is no rebound and no guarding.   Musculoskeletal: Normal range of motion. He exhibits no edema or tenderness.   Neurological: He is alert and oriented to person, place, and time. No cranial nerve deficit.   Skin: Skin is warm and dry. No rash noted. No erythema.   Psychiatric: He has a normal mood and affect. His behavior is normal. Judgment and thought content normal.   Vitals reviewed.    ECG: NSR nl [place], QRS, QTc        Assessment:       1. Paroxysmal atrial fibrillation         Plan:         Paroxysmal atrial fibrillation  Diagnosed 3/17  PVI, cryoballoon 5/8/17  Off OAC 6/17  Diltiazem and aspirin since 6/17      47 yoM pAF s/p PVI here for 6m visit post ablation. Excellent results off AAD therapy. No recurrence since ablation. Will continue diltiazem and aspirin. RTC 1y or as needed

## 2018-01-10 ENCOUNTER — PATIENT MESSAGE (OUTPATIENT)
Dept: CARDIOLOGY | Facility: CLINIC | Age: 48
End: 2018-01-10

## 2018-08-15 DIAGNOSIS — I47.10 SVT (SUPRAVENTRICULAR TACHYCARDIA): ICD-10-CM

## 2018-08-15 RX ORDER — DILTIAZEM HYDROCHLORIDE 120 MG/1
120 CAPSULE, COATED, EXTENDED RELEASE ORAL DAILY
Qty: 30 CAPSULE | Refills: 11 | Status: SHIPPED | OUTPATIENT
Start: 2018-08-15 | End: 2018-08-27 | Stop reason: SDUPTHER

## 2018-08-27 DIAGNOSIS — I47.10 SVT (SUPRAVENTRICULAR TACHYCARDIA): ICD-10-CM

## 2018-08-27 RX ORDER — DILTIAZEM HYDROCHLORIDE 120 MG/1
120 CAPSULE, COATED, EXTENDED RELEASE ORAL DAILY
Qty: 30 CAPSULE | Refills: 11 | Status: SHIPPED | OUTPATIENT
Start: 2018-08-27 | End: 2019-09-23 | Stop reason: SDUPTHER

## 2018-12-10 ENCOUNTER — PATIENT MESSAGE (OUTPATIENT)
Dept: CARDIOLOGY | Facility: CLINIC | Age: 48
End: 2018-12-10

## 2018-12-11 ENCOUNTER — PATIENT MESSAGE (OUTPATIENT)
Dept: ELECTROPHYSIOLOGY | Facility: CLINIC | Age: 48
End: 2018-12-11

## 2019-01-11 ENCOUNTER — OFFICE VISIT (OUTPATIENT)
Dept: UROLOGY | Facility: CLINIC | Age: 49
End: 2019-01-11
Payer: COMMERCIAL

## 2019-01-11 ENCOUNTER — PATIENT MESSAGE (OUTPATIENT)
Dept: ELECTROPHYSIOLOGY | Facility: CLINIC | Age: 49
End: 2019-01-11

## 2019-01-11 VITALS — HEART RATE: 80 BPM | DIASTOLIC BLOOD PRESSURE: 89 MMHG | SYSTOLIC BLOOD PRESSURE: 145 MMHG

## 2019-01-11 DIAGNOSIS — R39.9 LOWER URINARY TRACT SYMPTOMS: ICD-10-CM

## 2019-01-11 DIAGNOSIS — Z00.00 HEALTH CARE MAINTENANCE: ICD-10-CM

## 2019-01-11 DIAGNOSIS — I47.10 SVT (SUPRAVENTRICULAR TACHYCARDIA): Primary | ICD-10-CM

## 2019-01-11 PROCEDURE — 99204 PR OFFICE/OUTPT VISIT, NEW, LEVL IV, 45-59 MIN: ICD-10-PCS | Mod: S$GLB,,, | Performed by: UROLOGY

## 2019-01-11 PROCEDURE — 99204 OFFICE O/P NEW MOD 45 MIN: CPT | Mod: S$GLB,,, | Performed by: UROLOGY

## 2019-01-11 PROCEDURE — 99999 PR PBB SHADOW E&M-EST. PATIENT-LVL III: CPT | Mod: PBBFAC,,,

## 2019-01-11 PROCEDURE — 99999 PR PBB SHADOW E&M-EST. PATIENT-LVL III: ICD-10-PCS | Mod: PBBFAC,,,

## 2019-01-11 RX ORDER — TAMSULOSIN HYDROCHLORIDE 0.4 MG/1
0.4 CAPSULE ORAL DAILY
Qty: 30 CAPSULE | Refills: 0 | Status: SHIPPED | OUTPATIENT
Start: 2019-01-11 | End: 2019-08-14 | Stop reason: ALTCHOICE

## 2019-01-11 RX ORDER — TAMSULOSIN HYDROCHLORIDE 0.4 MG/1
0.4 CAPSULE ORAL DAILY
Qty: 30 CAPSULE | Refills: 0 | Status: SHIPPED | OUTPATIENT
Start: 2019-01-11 | End: 2019-01-11 | Stop reason: SDUPTHER

## 2019-01-11 NOTE — PROGRESS NOTES
Urology - [facility]  Resident Clinic  Staff - Sonny Garcia MD    SUBJECTIVE:     Chief Complaint: Lower urinary tract symptoms    History of Present Illness:  Manny Flores is a 48 y.o. male who presents to clinic for evaluation of LUTS.    + Nocturia- 2-3x  + urgency, without incontinence  + incomplete emptying    Negative for hematuria,  trauma, recurrent UTIs, urinary retention    He did have a PSA done last year that was 0.7.     Review of patient's allergies indicates:  No Known Allergies    Past Medical History:   Diagnosis Date    Atrial fibrillation     Hypertension     Supraventricular tachycardia     Thyroid disease      Past Surgical History:   Procedure Laterality Date    ABLATION N/A 5/8/2017    [facility] CATH LAB    ABLATION N/A 3/1/2017    [facility] CATH LAB    HERNIA REPAIR      mesh placed    RADIOFREQUENCY ABLATION  05/08/2017    PVI, cryoballoon    TRANSESOPHAGEAL ECHOCARDIOGRAM (LOY) N/A 5/8/2017    [facility] CATH LAB    wisdom teeth extracted       History reviewed. No pertinent family history.  Social History     Tobacco Use    Smoking status: Never Smoker   Substance Use Topics    Alcohol use: Yes     Comment: social events    Drug use: No        Review of Systems   Constitutional: Negative for activity change.   HENT: Negative for facial swelling.    Eyes: Negative for discharge.   Respiratory: Negative for apnea.    Cardiovascular: Negative for chest pain.   Gastrointestinal: Negative for abdominal distention.   Genitourinary: Negative for hematuria.   Musculoskeletal: Negative for gait problem.   Skin: Negative for color change.   Neurological: Negative for dizziness.   Hematological: Negative for adenopathy.   Psychiatric/Behavioral: Negative for agitation.       OBJECTIVE:     Anticoagulation:  No    Estimated body mass index is 27.41 kg/m² as calculated  "from the following:    Height as of 11/13/17: 5' 10" (1.778 m).    Weight as of 11/13/17: 86.6 kg (191 lb).    Vital Signs (Most Recent)  Pulse: 80 (01/11/19 1417)  BP: (!) 145/89 (01/11/19 1417)    Physical Exam   Constitutional: He is oriented to person, place, and time. He appears well-developed and well-nourished.   HENT:   Head: Normocephalic.   Eyes: Pupils are equal, round, and reactive to light.   Neck: Normal range of motion.   Cardiovascular: Normal rate.    Pulmonary/Chest: Effort normal.   Abdominal: Soft.   Genitourinary: Penis normal.   Genitourinary Comments: ROSMERY- 30 g, no nodules   Musculoskeletal: Normal range of motion.   Neurological: He is alert and oriented to person, place, and time.   Skin: Skin is warm.     Psychiatric: His behavior is normal.     PVR- 40 ml    BMP  Lab Results   Component Value Date     04/20/2017    K 4.3 04/20/2017     04/20/2017    CO2 30 (H) 04/20/2017    BUN 20 04/20/2017    CREATININE 1.2 04/20/2017    CALCIUM 9.6 04/20/2017    ANIONGAP 6 (L) 04/20/2017    ESTGFRAFRICA >60.0 04/20/2017    EGFRNONAA >60.0 04/20/2017       Lab Results   Component Value Date    WBC 6.90 04/20/2017    HGB 15.2 04/20/2017    HCT 43.5 04/20/2017    MCV 89 04/20/2017     04/20/2017       No results found for: PSA, PSADIAG, PSATOTAL, PSAFREE    Urine negative for all components    Imaging:    None pertinent    ASSESSMENT     1. SVT (supraventricular tachycardia)    2. Lower urinary tract symptoms    3. Health care maintenance        PLAN:     Will start Floamx 0.4 mg QHS. Message sent to patient's physician in regards to taking this with his cardiac history    Lifestyle modifications were discussed in great length- recommended decreased PO intake closer to bedtime.     PSA screening discussed in great length. He has a normal PSA, benign ROSMERY, and his father was diagnosed at 69 with his prostate cancer. I recommend he wait until he is 51 until he continues PSA screening. He " voiced his understanding.     Return to clinic in 6 months. If he continues to have irritative symptoms, would consider starting an anticholinergic.       Albin Juarez MD

## 2019-01-26 ENCOUNTER — PATIENT MESSAGE (OUTPATIENT)
Dept: ELECTROPHYSIOLOGY | Facility: CLINIC | Age: 49
End: 2019-01-26

## 2019-04-15 PROBLEM — Z00.00 HEALTH CARE MAINTENANCE: Status: RESOLVED | Noted: 2019-01-11 | Resolved: 2019-04-15

## 2019-04-25 ENCOUNTER — PATIENT MESSAGE (OUTPATIENT)
Dept: ELECTROPHYSIOLOGY | Facility: CLINIC | Age: 49
End: 2019-04-25

## 2019-06-20 ENCOUNTER — TELEPHONE (OUTPATIENT)
Dept: ELECTROPHYSIOLOGY | Facility: CLINIC | Age: 49
End: 2019-06-20

## 2019-06-21 ENCOUNTER — TELEPHONE (OUTPATIENT)
Dept: ELECTROPHYSIOLOGY | Facility: CLINIC | Age: 49
End: 2019-06-21

## 2019-06-21 DIAGNOSIS — I48.91 ATRIAL FIBRILLATION, UNSPECIFIED TYPE: Primary | ICD-10-CM

## 2019-08-09 DIAGNOSIS — I47.10 SVT (SUPRAVENTRICULAR TACHYCARDIA): Primary | ICD-10-CM

## 2019-08-14 ENCOUNTER — OFFICE VISIT (OUTPATIENT)
Dept: CARDIOLOGY | Facility: CLINIC | Age: 49
End: 2019-08-14
Payer: COMMERCIAL

## 2019-08-14 ENCOUNTER — CLINICAL SUPPORT (OUTPATIENT)
Dept: CARDIOLOGY | Facility: CLINIC | Age: 49
End: 2019-08-14
Payer: COMMERCIAL

## 2019-08-14 VITALS
WEIGHT: 200.63 LBS | BODY MASS INDEX: 28.72 KG/M2 | SYSTOLIC BLOOD PRESSURE: 150 MMHG | DIASTOLIC BLOOD PRESSURE: 86 MMHG | HEART RATE: 65 BPM | HEIGHT: 70 IN

## 2019-08-14 DIAGNOSIS — I47.10 SVT (SUPRAVENTRICULAR TACHYCARDIA): ICD-10-CM

## 2019-08-14 DIAGNOSIS — I48.0 PAROXYSMAL ATRIAL FIBRILLATION: Primary | ICD-10-CM

## 2019-08-14 PROCEDURE — 93005 ELECTROCARDIOGRAM TRACING: CPT | Mod: S$GLB,,, | Performed by: INTERNAL MEDICINE

## 2019-08-14 PROCEDURE — 93005 EKG 12-LEAD: ICD-10-PCS | Mod: S$GLB,,, | Performed by: INTERNAL MEDICINE

## 2019-08-14 PROCEDURE — 99214 OFFICE O/P EST MOD 30 MIN: CPT | Mod: S$GLB,,, | Performed by: INTERNAL MEDICINE

## 2019-08-14 PROCEDURE — 93010 EKG 12-LEAD: ICD-10-PCS | Mod: S$GLB,,, | Performed by: INTERNAL MEDICINE

## 2019-08-14 PROCEDURE — 99999 PR PBB SHADOW E&M-EST. PATIENT-LVL III: CPT | Mod: PBBFAC,,, | Performed by: INTERNAL MEDICINE

## 2019-08-14 PROCEDURE — 99999 PR PBB SHADOW E&M-EST. PATIENT-LVL III: ICD-10-PCS | Mod: PBBFAC,,, | Performed by: INTERNAL MEDICINE

## 2019-08-14 PROCEDURE — 99214 PR OFFICE/OUTPT VISIT, EST, LEVL IV, 30-39 MIN: ICD-10-PCS | Mod: S$GLB,,, | Performed by: INTERNAL MEDICINE

## 2019-08-14 PROCEDURE — 93010 ELECTROCARDIOGRAM REPORT: CPT | Mod: S$GLB,,, | Performed by: INTERNAL MEDICINE

## 2019-08-14 RX ORDER — LEVOTHYROXINE SODIUM 75 UG/1
TABLET ORAL
COMMUNITY
Start: 2019-05-29

## 2019-08-14 RX ORDER — ALFUZOSIN HYDROCHLORIDE 10 MG/1
10 TABLET, EXTENDED RELEASE ORAL DAILY
Refills: 11 | COMMUNITY
Start: 2019-07-29

## 2019-08-14 NOTE — PROGRESS NOTES
Subjective:    Patient ID:  Manny Flores is a 49 y.o. male who presents for follow-up of Atrial Fibrillation      49 yoM AF, SVT here for arrhythmia evaluation. He had post operative AF after a hernia surgery. At the time there was documented pre-excited AF. He was placed on diltiazem prn for symptomatic events. He has had several symptomatic palpitations over the next year. He underwent a treadmill test where he went into a narrow complex, short RP tachycardia 230 bpm moments into the recovery phase of his test. He felt rapid palpitations during this episode. He had several more non-sustained events. This episode matched his clinical symptoms. He was placed on long acting diltiazem and underwent an echo that showed normal EF and mild MR. He has CHADS VASC of 1 (HTN) and is on aspirin. He is here to discuss EPS/RFA    3/2/17: EPS yesterday showed no AP, no dual AVN physiology. AF easily induced with A pacing. Diltiazem started. Flecainide PRN offered but not taken.    4/17: He weaned himself off diltiazem. He had another AF/RVR episode that was symptomatic. He took diltiazem which broke the arrhythmia. He has stopped caffeine. He has had lifestyle changes. TSH was normal prior to the EPS. He wishes to proceed with PVI as opposed to AAD therapy.     6/12/17: He underwent succeesful PVI 5/8/17 without complication. He had some palpitations but no sustained events. He remains on apixaban and diltiazem    11/17: Rare, brief palpitations. Remains on diltiazem and aspirin.     Interval history: No recurrence of AF. Some palpitations. But no sustained arrhythmias. He has had some urologic issues.     Referring Physician: Kori Hawkins    Echo 2/1/17  LVEF 57%  Mild MR    Past Medical History:  No date: Atrial fibrillation  No date: Hypertension  No date: Supraventricular tachycardia  No date: Thyroid disease    Past Surgical History:  5/8/2017: ABLATION; N/A      Comment:  Performed by [facility]am [facility]  at [facility] CATH LAB  3/1/2017: ABLATION; N/A      Comment:  Performed by Chris Fam MD at [facility] CATH LAB  No date: HERNIA REPAIR      Comment:  mesh placed  05/08/2017: RADIOFREQUENCY ABLATION      Comment:  PVI, cryoballoon  5/8/2017: TRANSESOPHAGEAL ECHOCARDIOGRAM (LOY); N/A      Comment:  Performed by Crhis Fam MD at [facility] CATH LAB  No date: wisdom teeth extracted    Social History    Socioeconomic History      Marital status:       Spouse name: Not on file      Number of children: Not on file      Years of education: Not on file      Highest education level: Not on file    Occupational History      Not on file    Social Needs      Financial resource strain: Not on file      Food insecurity:        Worry: Not on file        Inability: Not on file      Transportation needs:        Medical: Not on file        Non-medical: Not on file    Tobacco Use      Smoking status: Never Smoker    Substance and Sexual Activity      Alcohol use: Yes        Comment: social events      Drug use: No      Sexual activity: Yes    Lifestyle      Physical activity:        Days per week: Not on file        Minutes per session: Not on file      Stress: Not on file    Relationships      Social connections:        Talks on phone: Not on file        Gets together: Not on file        Attends Jew service: Not on file        Active member of club or organization: Not on file        Attends meetings of clubs or organizations: Not on file        Relationship status: Not on file    Other Topics      Concerns:        Not on file    Social History Narrative      Not on file      History reviewed.  No pertinent family history.        Review of Systems   Constitution: Negative.   HENT: Negative.    Eyes: Negative.    Cardiovascular: Negative for chest pain, dyspnea on exertion, irregular heartbeat, leg swelling, near-syncope, palpitations and syncope.   Respiratory: Negative.  Negative for shortness of breath.    Endocrine:  Negative.    Hematologic/Lymphatic: Negative.    Skin: Negative.    Musculoskeletal: Negative.    Gastrointestinal: Negative.    Genitourinary: Negative.    Neurological: Negative.  Negative for dizziness and light-headedness.   Psychiatric/Behavioral: Negative.    Allergic/Immunologic: Negative.         Objective:    Physical Exam   Constitutional: He is oriented to person, place, and time. He appears well-developed and well-nourished. No distress.   HENT:   Head: Normocephalic and atraumatic.   Eyes: Pupils are equal, round, and reactive to light. EOM are normal.   Neck: Normal range of motion. No JVD present. No thyromegaly present.   Cardiovascular: Normal rate, regular rhythm, S1 normal, S2 normal and normal heart sounds. PMI is not displaced. Exam reveals no gallop and no friction rub.   No murmur heard.  Pulmonary/Chest: Effort normal and breath sounds normal. No respiratory distress. He has no wheezes. He has no rales.   Abdominal: Soft. Bowel sounds are normal. He exhibits no distension. There is no tenderness. There is no rebound and no guarding.   Musculoskeletal: Normal range of motion. He exhibits no edema or tenderness.   Neurological: He is alert and oriented to person, place, and time. No cranial nerve deficit.   Skin: Skin is warm and dry. No rash noted. No erythema.   Psychiatric: He has a normal mood and affect. His behavior is normal. Judgment and thought content normal.   Vitals reviewed.    ECG: NSR nl OR, QRS, QTc        Assessment:       1. Paroxysmal atrial fibrillation         Plan:       49 yoM pAF here for follow up. No recurrence since ablation. Continue current therapy. RTC as needed

## 2019-09-20 ENCOUNTER — PATIENT MESSAGE (OUTPATIENT)
Dept: CARDIOLOGY | Facility: CLINIC | Age: 49
End: 2019-09-20

## 2019-09-23 DIAGNOSIS — I47.10 SVT (SUPRAVENTRICULAR TACHYCARDIA): ICD-10-CM

## 2019-09-23 RX ORDER — DILTIAZEM HYDROCHLORIDE 120 MG/1
120 CAPSULE, COATED, EXTENDED RELEASE ORAL DAILY
Qty: 30 CAPSULE | Refills: 11 | Status: SHIPPED | OUTPATIENT
Start: 2019-09-23 | End: 2019-10-01 | Stop reason: SDUPTHER

## 2019-10-01 DIAGNOSIS — I47.10 SVT (SUPRAVENTRICULAR TACHYCARDIA): ICD-10-CM

## 2019-10-01 RX ORDER — DILTIAZEM HYDROCHLORIDE 120 MG/1
120 CAPSULE, COATED, EXTENDED RELEASE ORAL DAILY
Qty: 30 CAPSULE | Refills: 11 | Status: SHIPPED | OUTPATIENT
Start: 2019-10-01 | End: 2020-09-21 | Stop reason: SDUPTHER

## 2020-09-21 DIAGNOSIS — I47.10 SVT (SUPRAVENTRICULAR TACHYCARDIA): ICD-10-CM

## 2020-09-21 RX ORDER — DILTIAZEM HYDROCHLORIDE 120 MG/1
120 CAPSULE, COATED, EXTENDED RELEASE ORAL DAILY
Qty: 30 CAPSULE | Refills: 11 | Status: SHIPPED | OUTPATIENT
Start: 2020-09-21

## 2020-10-14 NOTE — PLAN OF CARE
Problem: Fall Risk (Adult)  Intervention: Safety Precautions  See epic      Comments:   See epic   Stroke support groups for patients, families, and friends/Signs and symptoms of stroke/Call 911 for stroke/Prescribed medications/Stroke education booklet/Risk factors for stroke/Stroke warning signs and symptoms/Need for follow up after discharge

## 2021-11-23 PROBLEM — S43.431D GLENOID LABRUM TEAR, RIGHT, SUBSEQUENT ENCOUNTER: Status: ACTIVE | Noted: 2021-11-23

## 2021-12-14 PROBLEM — S46.011A TRAUMATIC COMPLETE TEAR OF RIGHT ROTATOR CUFF: Status: ACTIVE | Noted: 2021-12-14

## 2021-12-14 PROBLEM — S43.431D GLENOID LABRUM TEAR, RIGHT, SUBSEQUENT ENCOUNTER: Status: RESOLVED | Noted: 2021-11-23 | Resolved: 2021-12-14

## 2024-07-08 ENCOUNTER — OUTSIDE PLACE OF SERVICE (OUTPATIENT)
Dept: INTERVENTIONAL RADIOLOGY/VASCULAR | Facility: CLINIC | Age: 54
End: 2024-07-08
Payer: COMMERCIAL

## 2025-01-28 ENCOUNTER — TELEPHONE (OUTPATIENT)
Dept: UROLOGY | Facility: CLINIC | Age: 55
End: 2025-01-28
Payer: COMMERCIAL